# Patient Record
Sex: FEMALE | Race: OTHER | URBAN - METROPOLITAN AREA
[De-identification: names, ages, dates, MRNs, and addresses within clinical notes are randomized per-mention and may not be internally consistent; named-entity substitution may affect disease eponyms.]

---

## 2023-10-26 ENCOUNTER — EMPLOYEE HEALTH (OUTPATIENT)
Dept: OTHER | Facility: HOSPITAL | Age: 28
End: 2023-10-26
Attending: PREVENTIVE MEDICINE

## 2023-10-26 DIAGNOSIS — Z00.00 WELLNESS EXAMINATION: ICD-10-CM

## 2023-10-26 DIAGNOSIS — Z11.1 SCREENING-PULMONARY TB: Primary | ICD-10-CM

## 2023-10-26 LAB
RUBV IGG SER QL: POSITIVE
RUBV IGG SER-ACNC: 40.9 IU/ML (ref 10–?)

## 2023-10-26 PROCEDURE — 86787 VARICELLA-ZOSTER ANTIBODY: CPT

## 2023-10-26 PROCEDURE — 86480 TB TEST CELL IMMUN MEASURE: CPT

## 2023-10-26 PROCEDURE — 86765 RUBEOLA ANTIBODY: CPT

## 2023-10-26 PROCEDURE — 86735 MUMPS ANTIBODY: CPT

## 2023-10-26 PROCEDURE — 86762 RUBELLA ANTIBODY: CPT

## 2023-10-27 LAB
MEV IGG SER-ACNC: 68.4 AU/ML (ref 16.5–?)
MUV IGG SER IA-ACNC: 19.9 AU/ML (ref 11–?)
VZV IGG SER IA-ACNC: 1876 (ref 165–?)

## 2023-10-30 LAB
M TB IFN-G CD4+ T-CELLS BLD-ACNC: 0.03 IU/ML
M TB TUBERC IFN-G BLD QL: NEGATIVE
M TB TUBERC IGNF/MITOGEN IGNF CONTROL: >10 IU/ML
QFT TB1 AG MINUS NIL: 0.03 IU/ML
QFT TB2 AG MINUS NIL: 0.01 IU/ML

## 2024-02-09 ENCOUNTER — LAB ENCOUNTER (OUTPATIENT)
Dept: LAB | Age: 29
End: 2024-02-09
Attending: PHYSICIAN ASSISTANT
Payer: COMMERCIAL

## 2024-02-09 ENCOUNTER — OFFICE VISIT (OUTPATIENT)
Dept: FAMILY MEDICINE CLINIC | Facility: CLINIC | Age: 29
End: 2024-02-09
Payer: COMMERCIAL

## 2024-02-09 ENCOUNTER — PATIENT MESSAGE (OUTPATIENT)
Dept: FAMILY MEDICINE CLINIC | Facility: CLINIC | Age: 29
End: 2024-02-09

## 2024-02-09 VITALS
SYSTOLIC BLOOD PRESSURE: 138 MMHG | WEIGHT: 211 LBS | DIASTOLIC BLOOD PRESSURE: 84 MMHG | HEART RATE: 73 BPM | HEIGHT: 64.57 IN | BODY MASS INDEX: 35.59 KG/M2

## 2024-02-09 DIAGNOSIS — E55.9 VITAMIN D DEFICIENCY: ICD-10-CM

## 2024-02-09 DIAGNOSIS — E66.09 CLASS 2 OBESITY DUE TO EXCESS CALORIES WITHOUT SERIOUS COMORBIDITY WITH BODY MASS INDEX (BMI) OF 35.0 TO 35.9 IN ADULT: ICD-10-CM

## 2024-02-09 DIAGNOSIS — Z00.00 ROUTINE GENERAL MEDICAL EXAMINATION AT A HEALTH CARE FACILITY: ICD-10-CM

## 2024-02-09 DIAGNOSIS — Z00.00 WELLNESS EXAMINATION: Primary | ICD-10-CM

## 2024-02-09 DIAGNOSIS — L30.9 DERMATITIS: ICD-10-CM

## 2024-02-09 PROBLEM — E66.812 CLASS 2 OBESITY DUE TO EXCESS CALORIES WITHOUT SERIOUS COMORBIDITY WITH BODY MASS INDEX (BMI) OF 35.0 TO 35.9 IN ADULT: Status: ACTIVE | Noted: 2024-02-09

## 2024-02-09 LAB
ALBUMIN SERPL-MCNC: 4.4 G/DL (ref 3.2–4.8)
ALBUMIN/GLOB SERPL: 1.3 {RATIO} (ref 1–2)
ALP LIVER SERPL-CCNC: 71 U/L
ALT SERPL-CCNC: 31 U/L
ANION GAP SERPL CALC-SCNC: 9 MMOL/L (ref 0–18)
AST SERPL-CCNC: 22 U/L (ref ?–34)
BASOPHILS # BLD AUTO: 0.05 X10(3) UL (ref 0–0.2)
BASOPHILS NFR BLD AUTO: 0.6 %
BILIRUB SERPL-MCNC: 0.4 MG/DL (ref 0.3–1.2)
BUN BLD-MCNC: 13 MG/DL (ref 9–23)
BUN/CREAT SERPL: 19.1 (ref 10–20)
CALCIUM BLD-MCNC: 9.3 MG/DL (ref 8.7–10.4)
CHLORIDE SERPL-SCNC: 104 MMOL/L (ref 98–112)
CHOLEST SERPL-MCNC: 163 MG/DL (ref ?–200)
CO2 SERPL-SCNC: 25 MMOL/L (ref 21–32)
CREAT BLD-MCNC: 0.68 MG/DL
DEPRECATED RDW RBC AUTO: 36.4 FL (ref 35.1–46.3)
EGFRCR SERPLBLD CKD-EPI 2021: 120 ML/MIN/1.73M2 (ref 60–?)
EOSINOPHIL # BLD AUTO: 0.34 X10(3) UL (ref 0–0.7)
EOSINOPHIL NFR BLD AUTO: 3.8 %
ERYTHROCYTE [DISTWIDTH] IN BLOOD BY AUTOMATED COUNT: 12.8 % (ref 11–15)
FASTING PATIENT LIPID ANSWER: NO
FASTING STATUS PATIENT QL REPORTED: NO
GLOBULIN PLAS-MCNC: 3.4 G/DL (ref 2.8–4.4)
GLUCOSE BLD-MCNC: 109 MG/DL (ref 70–99)
HCT VFR BLD AUTO: 38.7 %
HDLC SERPL-MCNC: 52 MG/DL (ref 40–59)
HGB BLD-MCNC: 12.9 G/DL
IMM GRANULOCYTES # BLD AUTO: 0.02 X10(3) UL (ref 0–1)
IMM GRANULOCYTES NFR BLD: 0.2 %
LDLC SERPL CALC-MCNC: 82 MG/DL (ref ?–100)
LYMPHOCYTES # BLD AUTO: 2.76 X10(3) UL (ref 1–4)
LYMPHOCYTES NFR BLD AUTO: 30.8 %
MCH RBC QN AUTO: 26.5 PG (ref 26–34)
MCHC RBC AUTO-ENTMCNC: 33.3 G/DL (ref 31–37)
MCV RBC AUTO: 79.5 FL
MONOCYTES # BLD AUTO: 0.42 X10(3) UL (ref 0.1–1)
MONOCYTES NFR BLD AUTO: 4.7 %
NEUTROPHILS # BLD AUTO: 5.38 X10 (3) UL (ref 1.5–7.7)
NEUTROPHILS # BLD AUTO: 5.38 X10(3) UL (ref 1.5–7.7)
NEUTROPHILS NFR BLD AUTO: 59.9 %
NONHDLC SERPL-MCNC: 111 MG/DL (ref ?–130)
OSMOLALITY SERPL CALC.SUM OF ELEC: 287 MOSM/KG (ref 275–295)
PLATELET # BLD AUTO: 287 10(3)UL (ref 150–450)
POTASSIUM SERPL-SCNC: 3.3 MMOL/L (ref 3.5–5.1)
PROT SERPL-MCNC: 7.8 G/DL (ref 5.7–8.2)
RBC # BLD AUTO: 4.87 X10(6)UL
SODIUM SERPL-SCNC: 138 MMOL/L (ref 136–145)
TRIGL SERPL-MCNC: 168 MG/DL (ref 30–149)
TSI SER-ACNC: 1.68 MIU/ML (ref 0.55–4.78)
VIT B12 SERPL-MCNC: 478 PG/ML (ref 211–911)
VIT D+METAB SERPL-MCNC: 13.8 NG/ML (ref 30–100)
VLDLC SERPL CALC-MCNC: 27 MG/DL (ref 0–30)
WBC # BLD AUTO: 9 X10(3) UL (ref 4–11)

## 2024-02-09 PROCEDURE — 99385 PREV VISIT NEW AGE 18-39: CPT | Performed by: PHYSICIAN ASSISTANT

## 2024-02-09 PROCEDURE — 80053 COMPREHEN METABOLIC PANEL: CPT

## 2024-02-09 PROCEDURE — 36415 COLL VENOUS BLD VENIPUNCTURE: CPT

## 2024-02-09 PROCEDURE — 85025 COMPLETE CBC W/AUTO DIFF WBC: CPT

## 2024-02-09 PROCEDURE — 80061 LIPID PANEL: CPT

## 2024-02-09 PROCEDURE — 82306 VITAMIN D 25 HYDROXY: CPT

## 2024-02-09 PROCEDURE — 83036 HEMOGLOBIN GLYCOSYLATED A1C: CPT

## 2024-02-09 PROCEDURE — 82607 VITAMIN B-12: CPT

## 2024-02-09 PROCEDURE — 84443 ASSAY THYROID STIM HORMONE: CPT

## 2024-02-09 RX ORDER — ALBUTEROL SULFATE 90 UG/1
AEROSOL, METERED RESPIRATORY (INHALATION)
COMMUNITY
Start: 2023-10-31

## 2024-02-09 RX ORDER — TRIAMCINOLONE ACETONIDE 1 MG/G
CREAM TOPICAL 2 TIMES DAILY PRN
Qty: 60 G | Refills: 1 | Status: SHIPPED | OUTPATIENT
Start: 2024-02-09 | End: 2024-02-10

## 2024-02-09 NOTE — PROGRESS NOTES
HPI:   Racheal Clemente is a 28 year old female who presents for an Annual Health Visit.   Patient is doing fine at this time. Patient denies of chest pain, SOB, N/V/C/D, fever, dizziness, syncope, abdominal pain. There are no other concerns today.      Allergies:     Allergies   Allergen Reactions    Cat Hair Extract Coughing, ITCHING, RASH and SHORTNESS OF BREATH    Mushrooms RASH     Around mouth       CURRENT MEDICATIONS   Current Outpatient Medications   Medication Sig Dispense Refill    triamcinolone 0.1 % External Cream Apply topically 2 (two) times daily as needed. 60 g 1    albuterol 108 (90 Base) MCG/ACT Inhalation Aero Soln INHALE 2 PUFFS BY MOUTH EVERY 6 HOURS FOR 7 DAYS (Patient not taking: Reported on 2/9/2024)        HISTORICAL INFORMATION   History reviewed. No pertinent past medical history.   History reviewed. No pertinent surgical history.   History reviewed. No pertinent family history.   SOCIAL HISTORY   Social History     Socioeconomic History    Marital status: Single   Tobacco Use    Smoking status: Never    Smokeless tobacco: Never   Vaping Use    Vaping Use: Never used   Substance and Sexual Activity    Alcohol use: Yes    Drug use: Never     Social History     Social History Narrative    Not on file        REVIEW OF SYSTEMS:     Review of Systems   Constitutional: Negative.    HENT: Negative.     Eyes: Negative.    Respiratory: Negative.     Cardiovascular: Negative.    Gastrointestinal: Negative.    Genitourinary: Negative.    Musculoskeletal: Negative.    Skin: Negative.    Neurological: Negative.    Psychiatric/Behavioral: Negative.           EXAM:   /84   Pulse 73   Ht 5' 4.57\" (1.64 m)   Wt 211 lb (95.7 kg)   LMP 01/18/2024 (Approximate)   BMI 35.58 kg/m²    Wt Readings from Last 6 Encounters:   02/09/24 211 lb (95.7 kg)     Body mass index is 35.58 kg/m².    Physical Exam  Vitals reviewed.   Constitutional:       Appearance: She is well-developed.   HENT:       Head: Normocephalic and atraumatic.      Right Ear: Tympanic membrane, ear canal and external ear normal. There is no impacted cerumen.      Left Ear: Tympanic membrane, ear canal and external ear normal. There is no impacted cerumen.      Nose: Nose normal.      Mouth/Throat:      Mouth: Mucous membranes are moist.      Pharynx: Oropharynx is clear. No oropharyngeal exudate or posterior oropharyngeal erythema.   Eyes:      General:         Right eye: No discharge.         Left eye: No discharge.      Conjunctiva/sclera: Conjunctivae normal.   Cardiovascular:      Rate and Rhythm: Normal rate and regular rhythm.      Heart sounds: Normal heart sounds.   Pulmonary:      Effort: Pulmonary effort is normal.      Breath sounds: Normal breath sounds.   Abdominal:      General: Abdomen is flat. Bowel sounds are normal. There is no distension.      Palpations: Abdomen is soft.      Tenderness: There is no abdominal tenderness. There is no right CVA tenderness or left CVA tenderness.   Genitourinary:     Vagina: Normal.   Musculoskeletal:         General: Normal range of motion.      Cervical back: Normal range of motion and neck supple.   Skin:     Findings: Rash present.   Neurological:      Mental Status: She is alert and oriented to person, place, and time.   Psychiatric:         Behavior: Behavior normal.         Thought Content: Thought content normal.         Judgment: Judgment normal.      There are macular rashes on the arms.    ASSESSMENT AND PLAN:   Racheal was seen today for new patient and routine physical.    Diagnoses and all orders for this visit:    Routine general medical examination at a health care facility  -     CBC With Differential With Platelet; Future  -     Comp Metabolic Panel (14); Future  -     Lipid Panel; Future  -     TSH W Reflex To Free T4; Future  -     Vitamin B12; Future  -     Hemoglobin A1C; Future    Vitamin D deficiency  -     Vitamin D; Future    Class 2 obesity due to excess  calories without serious comorbidity with body mass index (BMI) of 35.0 to 35.9 in adult  -     CBC With Differential With Platelet; Future  -     Comp Metabolic Panel (14); Future  -     Lipid Panel; Future  -     TSH W Reflex To Free T4; Future  -     Vitamin B12; Future  -     Hemoglobin A1C; Future    Dermatitis  -     triamcinolone 0.1 % External Cream; Apply topically 2 (two) times daily as needed.    Overall health discussed, exercise/activity appropriate for age and health status, heathy diety, preventive care, and upcoming screening discussed. Routine labs ordered.    There are no Patient Instructions on file for this visit.    The patient indicates understanding of these issues and agrees to the plan.    Problem List:  Patient Active Problem List   Diagnosis    Class 2 obesity due to excess calories without serious comorbidity with body mass index (BMI) of 35.0 to 35.9 in adult       Avi Thomsa PA-C  2/9/2024  2:39 PM

## 2024-02-10 LAB
EST. AVERAGE GLUCOSE BLD GHB EST-MCNC: 114 MG/DL (ref 68–126)
HBA1C MFR BLD: 5.6 % (ref ?–5.7)

## 2024-02-10 RX ORDER — TRIAMCINOLONE ACETONIDE 1 MG/G
CREAM TOPICAL 2 TIMES DAILY PRN
Qty: 60 G | Refills: 1 | Status: SHIPPED | OUTPATIENT
Start: 2024-02-10

## 2024-02-10 NOTE — TELEPHONE ENCOUNTER
Haven Oropeza, RN 2/10/2024 11:15 AM CST        ----- Message -----  From: Racheal Clemente  Sent: 2/9/2024 5:26 PM CST  To: Em Triage Support  Subject: RX     Hello!  My medication was sent to the wrong pharmacy; the location the medication was sent is a 35 [address] from where I live   it’s supposed to be [facility] on [address]   [address] [place] [address]

## 2024-02-12 ENCOUNTER — TELEPHONE (OUTPATIENT)
Dept: ORTHOPEDICS CLINIC | Facility: CLINIC | Age: 29
End: 2024-02-12

## 2024-02-12 DIAGNOSIS — M25.562 LEFT KNEE PAIN, UNSPECIFIED CHRONICITY: Primary | ICD-10-CM

## 2024-02-12 DIAGNOSIS — M25.561 PAIN IN BOTH KNEES, UNSPECIFIED CHRONICITY: ICD-10-CM

## 2024-02-12 DIAGNOSIS — M25.562 PAIN IN BOTH KNEES, UNSPECIFIED CHRONICITY: ICD-10-CM

## 2024-02-16 ENCOUNTER — LAB ENCOUNTER (OUTPATIENT)
Dept: LAB | Age: 29
End: 2024-02-16
Attending: PHYSICIAN ASSISTANT
Payer: COMMERCIAL

## 2024-02-16 DIAGNOSIS — E87.6 HYPOKALEMIA: ICD-10-CM

## 2024-02-16 LAB — POTASSIUM SERPL-SCNC: 3.3 MMOL/L (ref 3.5–5.1)

## 2024-02-16 PROCEDURE — 84132 ASSAY OF SERUM POTASSIUM: CPT

## 2024-02-16 PROCEDURE — 36415 COLL VENOUS BLD VENIPUNCTURE: CPT

## 2024-02-23 ENCOUNTER — LAB ENCOUNTER (OUTPATIENT)
Dept: LAB | Age: 29
End: 2024-02-23
Attending: PHYSICIAN ASSISTANT
Payer: COMMERCIAL

## 2024-02-23 DIAGNOSIS — E87.6 HYPOKALEMIA: ICD-10-CM

## 2024-02-23 LAB — POTASSIUM SERPL-SCNC: 3.7 MMOL/L (ref 3.5–5.1)

## 2024-02-23 PROCEDURE — 36415 COLL VENOUS BLD VENIPUNCTURE: CPT

## 2024-02-23 PROCEDURE — 84132 ASSAY OF SERUM POTASSIUM: CPT

## 2024-02-23 NOTE — TELEPHONE ENCOUNTER
XR ordered per ortho protocol. XR scheduled and patient was notified that they should arrive 15-20 minutes early, in order for them to complete imaging.

## 2024-03-15 ENCOUNTER — APPOINTMENT (OUTPATIENT)
Dept: GENERAL RADIOLOGY | Age: 29
End: 2024-03-15
Attending: PHYSICIAN ASSISTANT
Payer: COMMERCIAL

## 2024-03-15 ENCOUNTER — HOSPITAL ENCOUNTER (OUTPATIENT)
Age: 29
Discharge: HOME | End: 2024-03-15
Payer: COMMERCIAL

## 2024-03-15 VITALS
OXYGEN SATURATION: 100 % | TEMPERATURE: 98 F | RESPIRATION RATE: 20 BRPM | DIASTOLIC BLOOD PRESSURE: 107 MMHG | HEART RATE: 89 BPM | SYSTOLIC BLOOD PRESSURE: 157 MMHG

## 2024-03-15 DIAGNOSIS — S39.012A LUMBOSACRAL STRAIN, INITIAL ENCOUNTER: Primary | ICD-10-CM

## 2024-03-15 PROCEDURE — 99213 OFFICE O/P EST LOW 20 MIN: CPT

## 2024-03-15 PROCEDURE — 99203 OFFICE O/P NEW LOW 30 MIN: CPT

## 2024-03-15 PROCEDURE — 72100 X-RAY EXAM L-S SPINE 2/3 VWS: CPT | Performed by: PHYSICIAN ASSISTANT

## 2024-03-15 RX ORDER — IBUPROFEN 600 MG/1
600 TABLET ORAL EVERY 8 HOURS PRN
Qty: 15 TABLET | Refills: 0 | Status: SHIPPED | OUTPATIENT
Start: 2024-03-15 | End: 2024-03-20

## 2024-03-15 RX ORDER — LIDOCAINE 4 G/G
1 PATCH TOPICAL EVERY 24 HOURS
Qty: 10 PATCH | Refills: 0 | Status: SHIPPED | OUTPATIENT
Start: 2024-03-15 | End: 2024-03-25

## 2024-03-15 NOTE — ED INITIAL ASSESSMENT (HPI)
Patient arrives ambulatory [facility] c/o lower right back pain since Monday. Reports being unable to \"sit correctly\". Does report that she carries her son a lot. States sometimes the pain radiates down right leg.

## 2024-03-15 NOTE — ED PROVIDER NOTES
Chief Complaint   Patient presents with    Back Pain       HPI:     Racheal Clemente is a 28 year old female who presents for evaluation of 5-day history of right lower back pain.  Notes took Tylenol with mild improvement in previous days.  Denies previous evaluation for lower back pain including disc herniation or sciatica.  Notes pain worse with mobility and bending.  Denies associated injury fevers chills headache dizziness cough congestion sore throat neck pain chest pain shortness of breath abdominal pain vomiting diarrhea dysuria hematuria medic Amber urinary retention incontinence upper lower extremity weakness numbness or associated pain.  Ambulatory without assistance on arrival, pain maximum, 6 out of 10.      PFSH    PFS asessment screens reviewed and agree.  Nurses notes reviewed I agree with documentation.    History reviewed. No pertinent family history.  Family history reviewed with patient/caregiver and is not pertinent to presenting problem.  Social History     Socioeconomic History    Marital status: Single     Spouse name: Not on file    Number of children: Not on file    Years of education: Not on file    Highest education level: Not on file   Occupational History    Not on file   Tobacco Use    Smoking status: Never    Smokeless tobacco: Never   Vaping Use    Vaping Use: Never used   Substance and Sexual Activity    Alcohol use: Yes    Drug use: Never    Sexual activity: Not on file   Other Topics Concern    Not on file   Social History Narrative    Not on file     Social Determinants of Health     Financial Resource Strain: Not on file   Food Insecurity: Not on file   Transportation Needs: Not on file   Physical Activity: Not on file   Stress: Not on file   Social Connections: Not on file   Housing Stability: Not on file         ROS:   Positive for stated complaint: Back pain.  All other systems reviewed and negative except as noted above.  Constitutional and Vital Signs  Reviewed.      Physical Exam:     Findings:    BP (!) 157/107   Pulse 89   Temp 98 °F (36.7 °C) (Temporal)   Resp 20   LMP 02/19/2024 (Approximate)   SpO2 100%   GENERAL: well developed, well nourished, well hydrated, no distress  SKIN: good skin turgor, no obvious rashes  EXTREMITIES: Mild right paralumbar inferior aspect tenderness.  No midline tenderness.  No hip or pelvic tenderness or instability.  Normal straight leg raise bilaterally.  DP pulse and distal sensation intact.  NO Pitting edema.  YEN without difficulty  GI: No CVA or adnexal tenderness.  Negative Lechuga.  Negative McBurney.  No rebound.  Soft, non-tender, normal bowel sounds  HEAD: normocephalic, atraumatic  EYES: sclera non icteric bilateral, conjunctiva clear  NEURO: No focal deficits  PSYCH: Alert and oriented x3.  Answering questions appropriately.  Mood appropriate.    MDM/Assessment/Plan:   Orders for this encounter:    Orders Placed This Encounter    XR LUMBAR SPINE (MIN 2 VIEWS) (CPT=72100)     Order Specific Question:   What is the Relevant Clinical Indication / Reason for Exam?     Answer:   Lower back pain, rule out degenerative changes versus spondylolithiasis     Order Specific Question:   Release to patient     Answer:   Immediate    ibuprofen 600 MG Oral Tab     Sig: Take 1 tablet (600 mg total) by mouth every 8 (eight) hours as needed for Pain.     Dispense:  15 tablet     Refill:  0    lidocaine 4 % External Patch     Sig: Place 1 patch onto the skin daily for 10 days.     Dispense:  10 patch     Refill:  0       Labs performed this visit:  No results found for this or any previous visit (from the past 10 hour(s)).    MDM:  X-rays showed 6 lumbar vertebrae otherwise no acute process.  Exam and history most reflective of lumbosacral strain with associated spasms.  Patient agrees with topical lidocaine patch with alternative NSAID over the next few days versus previously used Tylenol.  Discussed muscle relaxer with patient  agreeing to hold pending therapeutic reevaluation next week if not improving to comfort.  Discussed PT considerations based on lingering symptoms through primary discretion.  No focal changes.  Instructed indications to go to the ER.  Happy with plan of care.    Diagnosis:    ICD-10-CM    1. Lumbosacral strain, initial encounter  S39.012A           All results reviewed and discussed with patient.  See AVS for detailed discharge instructions for your condition today.    Follow Up with:  Bee Biswas MD  [address]  [place] [address] [phone]    In 1 week  Primary referral if able to see your doctor.

## 2024-03-18 RX ORDER — ALBUTEROL SULFATE 90 UG/1
2 AEROSOL, METERED RESPIRATORY (INHALATION) EVERY 4 HOURS PRN
Qty: 18 G | Refills: 0 | Status: SHIPPED | OUTPATIENT
Start: 2024-03-18

## 2024-03-18 NOTE — TELEPHONE ENCOUNTER
Albuterol    Last OV: 2/9/24  Last refill date: 10/31/23 #/refills: 1/0  Upcoming appt: No future appointments.

## 2024-03-19 ENCOUNTER — TELEPHONE (OUTPATIENT)
Dept: ORTHOPEDICS CLINIC | Facility: CLINIC | Age: 29
End: 2024-03-19

## 2024-03-21 ENCOUNTER — OFFICE VISIT (OUTPATIENT)
Dept: ORTHOPEDICS CLINIC | Facility: CLINIC | Age: 29
End: 2024-03-21
Payer: COMMERCIAL

## 2024-03-21 VITALS — HEIGHT: 64 IN | WEIGHT: 211 LBS | BODY MASS INDEX: 36.02 KG/M2

## 2024-03-21 DIAGNOSIS — M54.16 LUMBAR RADICULOPATHY: Primary | ICD-10-CM

## 2024-03-21 DIAGNOSIS — Q76.49 LUMBARIZATION, VERTEBRA: ICD-10-CM

## 2024-03-21 PROCEDURE — 99204 OFFICE O/P NEW MOD 45 MIN: CPT | Performed by: FAMILY MEDICINE

## 2024-03-21 RX ORDER — DICLOFENAC SODIUM 75 MG/1
75 TABLET, DELAYED RELEASE ORAL 2 TIMES DAILY
Qty: 28 TABLET | Refills: 1 | Status: SHIPPED | OUTPATIENT
Start: 2024-03-21

## 2024-03-21 RX ORDER — CYCLOBENZAPRINE HCL 10 MG
10 TABLET ORAL NIGHTLY
Qty: 20 TABLET | Refills: 0 | Status: SHIPPED | OUTPATIENT
Start: 2024-03-21 | End: 2024-04-10

## 2024-03-26 NOTE — H&P
Sports Medicine Clinic Note     Subjective:     Chief Complaint   Patient presents with    Back Pain     LOWER BACK PAIN  ONSET: 2024  - pt thinks it is due to lifting son, constant pain, pain occasionally radiates down the leg  - no previous injury      History of Present Illness: This is a very pleasant 28 year old female who presents with lower back pain that occasionally radiates down both legs. The pain is described as a sharp, shooting sensation that worsens with prolonged sitting and certain movements, specifically twisting and bending. The patient states that the symptoms have been relapsing remitting for over 10 years since childhood, but seemed to worsen over the past 2 years since delivering her son. She struggled with worsening back pain during pregnancy and feels the epidural also flared up her pain. 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 No alleviating factors are noted, and the patient mentions that over-the-counter NSAIDs have provided minimal relief. No prior episodes or imaging studies are reported.    Objective:     Physical Exam    Ht 5' 4\" (1.626 m)   Wt 211 lb (95.7 kg)   LMP 2024 (Approximate)   BMI 36.22 kg/m²     Constitutional:   NAD. AOx3. Well-developed and Well-nourished.     Psychiatric:   Normal mood/ affect/ behavior. Judgment and thought content normal.    Lumbar Examination:    Inspection:   No visible deformity, swelling, or discoloration in the lumbar area.    Palpation:   Paraspinal tenderness noted throughout the lumbar spine as well as the bilateral SI joints.    Active/Passive ROM:   Limited flexion and extension due to pain; lateral rotation exacerbates the symptoms right side>left.    Neurovascular:   Normal strength and sensation in the bilateral lower extremities. Symmetric deep tendon patellar and achilles reflexes. No vascular  abnormalities noted. No focal deficits.    Special Orthopedic Tests:   Positive straight leg raise bilaterally.    Imaging:    Radiographs of the lumbar spine were personally reviewed in the office. No apparent fractures or dislocations. No significant spondylosis. 6 lumbar type vertebral bodies are noted.    Assessment & Plan:     28 year old female with clinical history and examination consistent with    Lumbar radiculopathy, bilateral  S1 Lumbarization    The patient will be treated with the following:    The patient will benefit from a multi-modal treatment approach. A prescription for physical therapy will be provided, focusing on core strengthening and spinal stabilization exercises. NSAIDs and muscle relaxants can be used for pain control. Can consider neuromodulating medications such as Gabapentin in the future to augment. Activity is to be limited to tolerance for the time being, and the patient is advised to avoid lifting heavy objects or twisting motions. A lumbar brace may be used for additional support if needed. Tentative follow-up is scheduled for 4-6 weeks to assess the response to treatment.    Velasquez Post DO, CAQSM   Primary Care Sports Medicine    Department of Orthopaedic Surgery  [address] [address]   [address] [place] [address]    t: [phone]  f: [phone]      [facility]

## 2024-04-03 ENCOUNTER — HOSPITAL ENCOUNTER (OUTPATIENT)
Dept: GENERAL RADIOLOGY | Age: 29
Discharge: HOME | End: 2024-04-03
Attending: PHYSICIAN ASSISTANT
Payer: COMMERCIAL

## 2024-04-03 ENCOUNTER — TELEPHONE (OUTPATIENT)
Dept: ORTHOPEDICS CLINIC | Facility: CLINIC | Age: 29
End: 2024-04-03

## 2024-04-03 DIAGNOSIS — M25.561 PAIN IN BOTH KNEES, UNSPECIFIED CHRONICITY: ICD-10-CM

## 2024-04-03 DIAGNOSIS — M25.562 PAIN IN BOTH KNEES, UNSPECIFIED CHRONICITY: ICD-10-CM

## 2024-04-03 DIAGNOSIS — M25.561 PAIN IN BOTH KNEES, UNSPECIFIED CHRONICITY: Primary | ICD-10-CM

## 2024-04-03 DIAGNOSIS — M25.562 PAIN IN BOTH KNEES, UNSPECIFIED CHRONICITY: Primary | ICD-10-CM

## 2024-04-03 PROCEDURE — 73564 X-RAY EXAM KNEE 4 OR MORE: CPT | Performed by: PHYSICIAN ASSISTANT

## 2024-04-03 NOTE — TELEPHONE ENCOUNTER
XR ordered per ortho protocol. XR scheduled and patient was notified to let them know that they should arrive 15-20 minutes early, in order for them to complete imaging.

## 2024-04-04 ENCOUNTER — OFFICE VISIT (OUTPATIENT)
Dept: ORTHOPEDICS CLINIC | Facility: CLINIC | Age: 29
End: 2024-04-04
Payer: COMMERCIAL

## 2024-04-04 VITALS — HEIGHT: 64 IN | WEIGHT: 211 LBS | BODY MASS INDEX: 36.02 KG/M2

## 2024-04-04 DIAGNOSIS — M22.41 CHONDROMALACIA, PATELLA, RIGHT: ICD-10-CM

## 2024-04-04 DIAGNOSIS — M22.42 CHONDROMALACIA OF LEFT PATELLA: Primary | ICD-10-CM

## 2024-04-04 PROCEDURE — 99203 OFFICE O/P NEW LOW 30 MIN: CPT | Performed by: PHYSICIAN ASSISTANT

## 2024-04-04 NOTE — PROGRESS NOTES
[facility] New Patient Note    CC: Bilateral knee pain    HPI: This 28 year old female presents today with complaints of bilateral knee pain, left greater than right.  Onset of symptoms started in December 2023, approximately 5 months ago with no injury.  Pain is worse in the left knee compared to the right.  It is localized to the anterior and medial aspect of the knee and is worse with going up and down stairs and getting up from being seated for a long period of time.  She denies any significant swelling, catching, locking or instability.  She denies any previous injuries or surgeries to the knees.  For treatment, she has tried rest and bracing with no significant improvement in symptoms.  She is also being seen for her lumbar radiculopathy but feels this is unrelated.  She is here for further evaluation.    History reviewed. No pertinent past medical history.  History reviewed. No pertinent surgical history.  Current Outpatient Medications   Medication Sig Dispense Refill    cyclobenzaprine 10 MG Oral Tab Take 1 tablet (10 mg total) by mouth nightly for 20 days. 20 tablet 0    diclofenac 75 MG Oral Tab EC Take 1 tablet (75 mg total) by mouth 2 (two) times daily. 28 tablet 1    albuterol 108 (90 Base) MCG/ACT Inhalation Aero Soln Inhale 2 puffs into the lungs every 4 (four) hours as needed for Wheezing. 18 g 0    ergocalciferol 1.25 MG (50042 UT) Oral Cap Take 1 capsule (50,000 Units total) by mouth every 7 days. 12 capsule 3     Allergies   Allergen Reactions    Cat Hair Extract Coughing, ITCHING, RASH and SHORTNESS OF BREATH    Mushrooms RASH     Around mouth     History reviewed. No pertinent family history.  Social History     Occupational History    Not on file   Tobacco Use    Smoking status: Never    Smokeless tobacco: Never   Vaping Use    Vaping Use: Never used   Substance and Sexual Activity    Alcohol use: Yes    Drug use: Never    Sexual activity: Not on file        ROS:  Complete review of  symptoms was reviewed and is non-contributory to the chief complaint as mentioned above.      Physical Exam: She is a pleasant 28-year-old female in no acute distress.  She ambulates with a nonantalgic gait.  Exam of bilateral knees and lower extremities reveals that she has mild patellofemoral crepitus with range of motion bilaterally.  She has full extension and pain with flexion to 120 degrees.  No palpable effusion.  She does have medial joint line tenderness on the left knee.  No joint line tenderness of the right knee.  Negative Steinmann and Justin bilaterally.  Stable ligamentous exam.  Sensation is present to light touch.        Imaging: XR KNEE, COMPLETE (4 OR MORE VIEWS), LEFT (CPT=73564)    Result Date: 4/3/2024  CONCLUSION:  No acute fractures.   LOCATION:  [facility]   Dictated by (CST): David Rose MD on 4/03/2024 at 4:41 PM     Finalized by (CST): David Rose MD on 4/03/2024 at 4:43 PM       XR KNEE, COMPLETE (4 OR MORE VIEWS), RIGHT (CPT=73564)    Result Date: 4/3/2024  CONCLUSION:  No acute findings.   LOCATION:  [facility]   Dictated by (CST): David Rose MD on 4/03/2024 at 4:41 PM     Finalized by (CST): David Rose MD on 4/03/2024 at 4:41 PM              Assessment: Bilateral knee chondromalacia patella      Plan: I discussed xray and exam findings with the patient are consistent with chondromalacia patella.  I recommend continued conservative treatment including oral anti-inflammatories with a 7-day course of Aleve, quad stretching, and activity modification.  She will avoid any repetitive squatting or lunging type activities.  She is attending formal therapy for her lumbar spine and I would like to incorporate the knees for quad stretching.  A PT prescription was given.  If symptoms or not improving over the next 2 to 4 weeks, further imaging of the left knee with an MRI scan may be considered.  She will follow-up with me at any time for persistent or worsening symptoms,  questions or concerns.        Ada Sawyer PA-C  Orthopedic Surgery   [address] [place] [id number]   t: [phone]  f: [phone]

## 2024-04-11 RX ORDER — CYCLOBENZAPRINE HCL 10 MG
10 TABLET ORAL NIGHTLY
Qty: 20 TABLET | Refills: 0 | Status: SHIPPED | OUTPATIENT
Start: 2024-04-11 | End: 2024-04-11

## 2024-04-11 RX ORDER — CYCLOBENZAPRINE HCL 10 MG
5 TABLET ORAL NIGHTLY
Qty: 10 TABLET | Refills: 0 | Status: SHIPPED | OUTPATIENT
Start: 2024-04-11 | End: 2024-05-01

## 2024-05-07 ENCOUNTER — OFFICE VISIT (OUTPATIENT)
Dept: FAMILY MEDICINE CLINIC | Facility: CLINIC | Age: 29
End: 2024-05-07
Payer: COMMERCIAL

## 2024-05-07 VITALS
DIASTOLIC BLOOD PRESSURE: 80 MMHG | BODY MASS INDEX: 36.37 KG/M2 | SYSTOLIC BLOOD PRESSURE: 123 MMHG | WEIGHT: 213 LBS | HEART RATE: 69 BPM | HEIGHT: 64 IN

## 2024-05-07 DIAGNOSIS — G44.209 TENSION HEADACHE: Primary | ICD-10-CM

## 2024-05-07 PROCEDURE — 99213 OFFICE O/P EST LOW 20 MIN: CPT | Performed by: PHYSICIAN ASSISTANT

## 2024-05-07 RX ORDER — AMOXICILLIN AND CLAVULANATE POTASSIUM 875; 125 MG/1; MG/1
1 TABLET, FILM COATED ORAL 2 TIMES DAILY
COMMUNITY
Start: 2024-05-05

## 2024-05-07 NOTE — PROGRESS NOTES
HPI:     Headache   This is a new problem. The current episode started in the past 7 days. The problem has been unchanged. The pain is located in the Temporal region. The pain does not radiate. The quality of the pain is described as aching. Pertinent negatives include no abdominal pain, blurred vision, coughing, dizziness, ear pain, eye pain, fever, nausea, phonophobia, photophobia, rhinorrhea, scalp tenderness, sinus pressure, sore throat or vomiting.       Medications:     Current Outpatient Medications   Medication Sig Dispense Refill    amoxicillin clavulanate 875-125 MG Oral Tab Take 1 tablet by mouth 2 (two) times daily.      diclofenac 75 MG Oral Tab EC Take 1 tablet (75 mg total) by mouth 2 (two) times daily. 28 tablet 1    albuterol 108 (90 Base) MCG/ACT Inhalation Aero Soln Inhale 2 puffs into the lungs every 4 (four) hours as needed for Wheezing. 18 g 0    ergocalciferol 1.25 MG (67792 UT) Oral Cap Take 1 capsule (50,000 Units total) by mouth every 7 days. 12 capsule 3       Allergies:     Allergies   Allergen Reactions    Cat Hair Extract Coughing, ITCHING, RASH and SHORTNESS OF BREATH    Mushrooms RASH     Around mouth       History:     Health Maintenance   Topic Date Due    Pap Smear  Never done    COVID-19 Vaccine (3 - 2023-24 season) 09/01/2023    Annual Physical  02/09/2025    DTaP,Tdap,and Td Vaccines (3 - Td or Tdap) 12/20/2031    Influenza Vaccine  Completed    Annual Depression Screening  Completed    Pneumococcal Vaccine: Birth to 64yrs  Aged Out       Patient's last menstrual period was 04/23/2024 (approximate).   Past Medical History:   History reviewed. No pertinent past medical history.    Past Surgical History:   History reviewed. No pertinent surgical history.    Family History:   History reviewed. No pertinent family history.    Social History:     Social History     Socioeconomic History    Marital status: Single     Spouse name: Not on file    Number of children: Not on file    Years  of education: Not on file    Highest education level: Not on file   Occupational History    Not on file   Tobacco Use    Smoking status: Never    Smokeless tobacco: Never   Vaping Use    Vaping status: Never Used   Substance and Sexual Activity    Alcohol use: Yes    Drug use: Never    Sexual activity: Not on file   Other Topics Concern    Not on file   Social History Narrative    Not on file     Social Determinants of Health     Financial Resource Strain: Not on File (2022)    Received from [facility]     Financial Resource Strain     Financial Resource Strain: 0   Food Insecurity: Not on File (2022)    Received from [facility]     Food Insecurity     Food: 0   Transportation Needs: Not on File (2022)    Received from [facility]     Transportation Needs     Transportation: 0   Physical Activity: Not on File (2022)    Received from [facility]     Physical Activity     Physical Activity: 0   Stress: Not on File (2022)    Received from [facility]     Stress     Stress: 0   Social Connections: Not on File (2022)    Received from [facility]     Social Connections     Social Connections and Isolation: 0   Housing Stability: Not on File (2022)    Received from [facility]     Housing Stability     Housin       Review of Systems:   Review of Systems   Constitutional:  Negative for activity change, chills, fatigue and fever.   HENT:  Negative for congestion, ear discharge, ear pain, postnasal drip, rhinorrhea, sinus pressure, sinus pain and sore throat.    Eyes:  Negative for blurred vision, photophobia and pain.   Respiratory:  Negative for cough, chest tightness, shortness of breath and wheezing.    Cardiovascular:  Negative for chest pain and palpitations.   Gastrointestinal:  Negative for abdominal distention, abdominal pain, blood in stool, constipation, diarrhea, nausea and vomiting.   Skin:  Negative for rash.   Neurological:  Positive for headaches. Negative for dizziness.        Vitals:    24 1402   BP:  123/80   Pulse: 69   Weight: 213 lb (96.6 kg)   Height: 5' 4\" (1.626 m)     Body mass index is 36.56 kg/m².    Physical Exam:   Physical Exam  Vitals reviewed.   Constitutional:       General: She is not in acute distress.     Appearance: She is well-developed.   HENT:      Head: Normocephalic and atraumatic.      Right Ear: Tympanic membrane, ear canal and external ear normal. There is no impacted cerumen.      Left Ear: Tympanic membrane, ear canal and external ear normal. There is no impacted cerumen.      Nose: Nose normal.      Mouth/Throat:      Mouth: Mucous membranes are moist.      Pharynx: Oropharynx is clear. No oropharyngeal exudate or posterior oropharyngeal erythema.   Eyes:      General:         Right eye: No discharge.         Left eye: No discharge.      Conjunctiva/sclera: Conjunctivae normal.   Cardiovascular:      Rate and Rhythm: Normal rate and regular rhythm.      Heart sounds: Normal heart sounds, S1 normal and S2 normal. No murmur heard.  Pulmonary:      Effort: Pulmonary effort is normal.      Breath sounds: Normal breath sounds. No wheezing or rales.   Chest:      Chest wall: No tenderness.   Lymphadenopathy:      Cervical: No cervical adenopathy.   Skin:     Findings: No rash.   Neurological:      Mental Status: She is alert and oriented to person, place, and time.   Psychiatric:         Behavior: Behavior is cooperative.        Assessment and Plan::     Problem List Items Addressed This Visit    None  Visit Diagnoses       Tension headache    -  Primary        Advise patient to take Tylenol or Ibuprofen as needed for pain.    Discussed plan of care with pt and pt is in agreement.All questions answered. Pt to call with questions or concerns.

## 2024-05-24 ENCOUNTER — NURSE TRIAGE (OUTPATIENT)
Dept: FAMILY MEDICINE CLINIC | Facility: CLINIC | Age: 29
End: 2024-05-24

## 2024-05-24 NOTE — TELEPHONE ENCOUNTER
Action Requested: Summary for Provider     []  Critical Lab, Recommendations Needed  [x] Need Additional Advice  []   FYI    []   Need Orders  [x] Need Medications Sent to Pharmacy  []  Other     SUMMARY: Yonas Thomas PA: patient is asking if you can send medication?    Reason for call: Vaginal Discharge  Onset: 2 days ago    Patient complains of vaginal white discharge, vaginal irritation. Burning sensation.     Patient was recommended appointment, no available appointment and patient is leaving town this weekend.   Reason for Disposition   Symptoms of a yeast infection' (i.e., itchy, white discharge, not bad smelling) and not improved > 3 days following Care Advice    Protocols used: Vaginal [id number]-[id number]-WR    Patient also sent details in [facility]. See other encounter today.

## 2024-05-25 NOTE — TELEPHONE ENCOUNTER
Spoke with patient and she is currently out of town. Having vaginal itching and some slight burning sensation with urination. She inquired as to any over the counter products she can use while out of town. Advised , miconazole (Monistat-3) push fluids specifically water. For worsening symptoms Immediate or Urgent care in the area. Scheduled an office visit for 5/28/24 at 2 pm with Oliver

## 2024-07-31 ENCOUNTER — HOSPITAL ENCOUNTER (OUTPATIENT)
Dept: GENERAL RADIOLOGY | Age: 29
Discharge: HOME | End: 2024-07-31
Attending: PHYSICIAN ASSISTANT
Payer: COMMERCIAL

## 2024-07-31 DIAGNOSIS — G89.29 CHRONIC PAIN OF LEFT ANKLE: ICD-10-CM

## 2024-07-31 DIAGNOSIS — M25.572 CHRONIC PAIN OF LEFT ANKLE: ICD-10-CM

## 2024-07-31 PROCEDURE — 73610 X-RAY EXAM OF ANKLE: CPT | Performed by: PHYSICIAN ASSISTANT

## 2024-08-01 ENCOUNTER — OFFICE VISIT (OUTPATIENT)
Dept: ORTHOPEDICS CLINIC | Facility: CLINIC | Age: 29
End: 2024-08-01
Payer: COMMERCIAL

## 2024-08-01 VITALS — HEIGHT: 63 IN | WEIGHT: 210 LBS | BODY MASS INDEX: 37.21 KG/M2

## 2024-08-01 DIAGNOSIS — M25.572 CHRONIC PAIN OF LEFT ANKLE: Primary | ICD-10-CM

## 2024-08-01 DIAGNOSIS — G89.29 CHRONIC PAIN OF LEFT ANKLE: Primary | ICD-10-CM

## 2024-08-01 PROCEDURE — 99213 OFFICE O/P EST LOW 20 MIN: CPT | Performed by: PHYSICIAN ASSISTANT

## 2024-08-01 NOTE — PROGRESS NOTES
[facility] New Problem Note    CC: Left ankle pain    HPI: This 28 year old female presents today with complaints of left ankle pain.  Onset of symptoms started about 2 years ago when she tripped and fell over an large [place].  She twisted the ankle and had difficulty with weightbearing for a week.  She was seen and evaluated by chiropractor who advised conservative treatment.  She was seen a week thereafter and underwent an adjustment which caused significant pain.  Since then, she has noticed intermittent pain and swelling.  Pain is localized to the anterolateral aspect of the ankle.  It is worse with weightbearing activities.  She does note associated swelling.  For treatment, she has tried rest, ice and elevation with no improvement in symptoms.  Of note she also notes some numbness and tingling in the foot occasionally as well.  She is here for further evaluation.    History reviewed. No pertinent past medical history.  History reviewed. No pertinent surgical history.  Current Outpatient Medications   Medication Sig Dispense Refill    diclofenac 75 MG Oral Tab EC Take 1 tablet (75 mg total) by mouth 2 (two) times daily. 28 tablet 1    albuterol 108 (90 Base) MCG/ACT Inhalation Aero Soln Inhale 2 puffs into the lungs every 4 (four) hours as needed for Wheezing. 18 g 0     Allergies   Allergen Reactions    Cat Hair Extract Coughing, ITCHING, RASH and SHORTNESS OF BREATH    Mushrooms RASH     Around mouth     History reviewed. No pertinent family history.  Social History     Occupational History    Not on file   Tobacco Use    Smoking status: Never    Smokeless tobacco: Never   Vaping Use    Vaping status: Never Used   Substance and Sexual Activity    Alcohol use: Yes    Drug use: Never    Sexual activity: Not on file        ROS:  Complete review of system was reviewed and is non-contributory to the chief complaint except as mentioned above.      Physical Exam: She is a pleasant 28-year-old female in no acute  distress.  She ambulates with a nonantalgic gait.  Exam of the left foot and ankle reveals that the overlying skin is intact.  She has symmetric dorsiflexion plantarflexion of the ankle.  No tenderness to palpation over the medial malleolus or lateral malleolus.  No tenderness at the calcaneofibular ligament.  She is tender to palpation over the ATFL.  She has pain with inversion of the foot.  No pain with eversion.  She has a stable anterior drawer but discomfort with this test.  Sensation is present to light touch.          Imaging: XR ANKLE (MIN 3 VIEWS), LEFT (CPT=73610)    Result Date: 7/31/2024  CONCLUSION:  Mild calcaneal spurring.  No fracture or dislocation.   LOCATION:  [facility]   Dictated by (CST): Jay Wood MD on 7/31/2024 at 6:38 PM     Finalized by (CST): Jay Wood MD on 7/31/2024 at 6:39 PM              Assessment: Chronic left ankle pain      Plan: I discussed x-ray and exam findings with the patient show no previous fractures.  She most likely strained the left ankle but unfortunately this has caused persistent symptoms.  She does have tenderness localized over the ATFL.  I recommend continued conservative treatment including oral anti-inflammatories as needed, formal physical therapy and brace protection for longer walks.  She does have an upcoming trip to the [place] and an ankle brace was given during the visit.  She will contact me when she returns from vacation to initiate formal therapy she will also try compression stockings.  If symptoms are worsening including pain and instability, further imaging including an MRI scan may be considered at that time.  She will follow-up with me in the meantime with any worsening symptoms, questions or concerns.        Ada Sawyer PA-C  Orthopedic Surgery   [address] [place] [id number]   t: [phone]  f: [phone]

## 2024-09-02 NOTE — PROGRESS NOTES
Chief Complaint   Patient presents with    Gyn Exam     Pt c/o vaginal pain starting a week ago after intercourse and has been going on ever since starting to feel burning and more discomfort.    Annual     Pt is here for pap smear. Declined chaperone             HPI:   Racheal is 28 year old , here today for Annual Exam and vaginal pain for about a week after intercourse.  Had a burning sensation afterwards. After about 5 days felt itching and had intercourse again and feels like paper cuts.     Menstrual cycles: regular/ 6-7 days/ light, then heavy then spotting   Sexually active: yes, partner of 11 years with 2 children  Contraception: none, does want a 3rd child  STD screening: desires  Depression: denies    Diet/Exercise: general/ a lot of walking at [facility] [place]  Routine dental care: has not gone for a year, has to get wisdom teeth pulled  Seatbelt use: yes    Note: This is a gyn only visit. Pt has PCP none /is referred back to PCP for care of non-gyn any concerns listed here.    HISTORY:  History reviewed. No pertinent past medical history.   Hx Prior Abnormal Pap: No    History reviewed. No pertinent surgical history.   History reviewed. No pertinent family history.   Social History:   Social History     Socioeconomic History    Marital status: Single   Tobacco Use    Smoking status: Never    Smokeless tobacco: Never   Vaping Use    Vaping status: Never Used   Substance and Sexual Activity    Alcohol use: Yes    Drug use: Never     Social Determinants of Health     Financial Resource Strain: Not on File (2022)    Received from [facility]    Financial Resource Strain     Financial Resource Strain: 0   Food Insecurity: Not on File (2022)    Received from [facility]    Food Insecurity     Food: 0   Transportation Needs: Not on File (2022)    Received from [facility]    Transportation Needs     Transportation: 0   Physical Activity: Not on File (2022)    Received from [facility]    Physical Activity     Physical Activity: 0   Stress: Not on File (2022)    Received from [facility]    Stress     Stress: 0   Social Connections: Not on File (2022)    Received from [facility]    Social Connections     Social Connections and Isolation: 0   Housing Stability: Not on File (2022)    Received from [facility]    Housing Stability     Housin       Safe relationship/safe home environment yes, lives with Father of her children     Depression Screening (PHQ-2/PHQ-9): Over the LAST 2 WEEKS   Little interest or pleasure in doing things (over the last two weeks)?: Not at all    Feeling down, depressed, or hopeless (over the last two weeks)?: Not at all    PHQ-2 SCORE: 0        Immunization History   Administered Date(s) Administered    Covid-19 Vaccine Moderna 100 mcg/0.5 ml 2021, 2021    DTAP 2014    FLUZONE 6 months and older PFS 0.5 ml (98583) 2021    Influenza 2014, 10/26/2023    TDAP 2021        Medications (Active prior to today's visit):  Current Outpatient Medications   Medication Sig Dispense Refill    teraconazole 0.4 % Vaginal Cream Place 1 applicator vaginally nightly for 7 days. 45 g 1    diclofenac 75 MG Oral Tab EC Take 1 tablet (75 mg total) by mouth 2 (two) times daily. (Patient not taking: Reported on 9/3/2024) 28 tablet 1    albuterol 108 (90 Base) MCG/ACT Inhalation Aero Soln Inhale 2 puffs into the lungs every 4 (four) hours as needed for Wheezing. (Patient not taking: Reported on 9/3/2024) 18 g 0       Allergies:  Allergies   Allergen Reactions    Cat Hair Extract Coughing, ITCHING, RASH and SHORTNESS OF BREATH    Mushrooms RASH     Around mouth       ROS:  Review of Systems   Constitutional: Negative.    HENT: Negative.     Eyes: Negative.    Respiratory: Negative.     Cardiovascular: Negative.    Gastrointestinal: Negative.    Genitourinary:  Positive for vaginal pain.   Musculoskeletal: Negative.    Skin: Negative.     Neurological: Negative.    Psychiatric/Behavioral: Negative.         PHYSICAL EXAM:  Vitals:    09/03/24 1158   BP: 129/81   Pulse: 61     Physical Exam  Vitals reviewed.   Constitutional:       Appearance: Normal appearance.   HENT:      Head: Normocephalic.   Cardiovascular:      Rate and Rhythm: Normal rate.   Pulmonary:      Effort: Pulmonary effort is normal.   Chest:   Breasts:     Right: Normal. No mass or tenderness.      Left: Normal. No mass or tenderness.   Genitourinary:     Pubic Area: No rash.       Labia:         Right: No lesion.         Left: No lesion.       Vagina: Vaginal discharge present. No bleeding.      Cervix: Friability present. No cervical motion tenderness.      Uterus: Normal. Not enlarged and not tender.       Adnexa:         Right: No mass or tenderness.          Left: No mass or tenderness.        Comments: Mild excoriation on lower labia/posterior vag wall.  Creamy yellow vag discharge, friable cervix  Lymphadenopathy:      Upper Body:      Right upper body: No axillary adenopathy.      Left upper body: No axillary adenopathy.      Lower Body: No right inguinal adenopathy. No left inguinal adenopathy.   Neurological:      Mental Status: She is alert and oriented to person, place, and time.   Psychiatric:         Mood and Affect: Mood normal.         Behavior: Behavior normal.          No results found for this or any previous visit (from the past 24 hour(s)).      ASSESSMENT/PLAN:     Racheal was seen today for gyn exam and annual.    Diagnoses and all orders for this visit:    Well woman exam with routine gynecological exam  -     ThinPrep PAP Smear; Future  -     Hpv Dna  High Risk , Thin Prep Collect; Future    General counseling and advice for contraceptive management    Routine screening for STI (sexually transmitted infection)  -     Chlamydia/GC PCR Combo; Future  -     Vaginitis Vaginosis PCR Panel; Future    Screening for cervical cancer  -     ThinPrep PAP Smear;  Future  -     Hpv Dna  High Risk , Thin Prep Collect; Future    Other orders  -     teraconazole 0.4 % Vaginal Cream; Place 1 applicator vaginally nightly for 7 days.        Last Pap: 8/5/21- NILM, due today  Next annual due: 1 year  Follow-up/Return to clinic: PRN    Counseling:   Best hygiene practices  Recommendations and rationale for COVID, Tdap, HPV, and flu shots  Contraceptive method(s), STI and HIV risk reduction/condom use  Emergency contraception reviewed  Intimate Partner Violence/recognition of sexual coercion and methods to resist and abstain as a choice  Frequency of health screening and personal risks  Pap, SBE/CBE, mammography  Smoking cessation/avoidance encouraged  Preconception counseling, including use of folic acid  Benefits of daily vitamin D, folic acid, and adequate fresh fruits and vegetables  Benefits of daily exercise      20 minutes face to face counseling, chart review, orders and coordination of care    Patient verbalized understanding, All questions answered. No barriers to learning identified    Brandan Willett, ISREAL

## 2024-09-03 ENCOUNTER — OFFICE VISIT (OUTPATIENT)
Dept: OBGYN CLINIC | Facility: CLINIC | Age: 29
End: 2024-09-03
Payer: COMMERCIAL

## 2024-09-03 VITALS
SYSTOLIC BLOOD PRESSURE: 129 MMHG | HEART RATE: 61 BPM | DIASTOLIC BLOOD PRESSURE: 81 MMHG | BODY MASS INDEX: 36.82 KG/M2 | WEIGHT: 215.69 LBS | HEIGHT: 64 IN

## 2024-09-03 DIAGNOSIS — Z30.09 GENERAL COUNSELING AND ADVICE FOR CONTRACEPTIVE MANAGEMENT: ICD-10-CM

## 2024-09-03 DIAGNOSIS — Z11.3 ROUTINE SCREENING FOR STI (SEXUALLY TRANSMITTED INFECTION): ICD-10-CM

## 2024-09-03 DIAGNOSIS — Z12.4 SCREENING FOR CERVICAL CANCER: ICD-10-CM

## 2024-09-03 DIAGNOSIS — Z01.419 WELL WOMAN EXAM WITH ROUTINE GYNECOLOGICAL EXAM: Primary | ICD-10-CM

## 2024-09-03 PROCEDURE — 99385 PREV VISIT NEW AGE 18-39: CPT | Performed by: ADVANCED PRACTICE MIDWIFE

## 2024-09-03 RX ORDER — TERCONAZOLE 0.4 %
1 CREAM WITH APPLICATOR VAGINAL NIGHTLY
Qty: 45 G | Refills: 1 | Status: SHIPPED | OUTPATIENT
Start: 2024-09-03 | End: 2024-09-10

## 2024-09-04 LAB
BV BACTERIA DNA VAG QL NAA+PROBE: NEGATIVE
C GLABRATA DNA VAG QL NAA+PROBE: NEGATIVE
C KRUSEI DNA VAG QL NAA+PROBE: NEGATIVE
C TRACH DNA SPEC QL NAA+PROBE: NEGATIVE
CANDIDA DNA VAG QL NAA+PROBE: NEGATIVE
HPV E6+E7 MRNA CVX QL NAA+PROBE: NEGATIVE
N GONORRHOEA DNA SPEC QL NAA+PROBE: NEGATIVE
T VAGINALIS DNA VAG QL NAA+PROBE: NEGATIVE

## 2024-12-10 ENCOUNTER — PATIENT MESSAGE (OUTPATIENT)
Dept: FAMILY MEDICINE CLINIC | Facility: CLINIC | Age: 29
End: 2024-12-10

## 2024-12-10 RX ORDER — ALBUTEROL SULFATE 90 UG/1
2 INHALANT RESPIRATORY (INHALATION) EVERY 4 HOURS PRN
Qty: 18 G | Refills: 0 | Status: CANCELLED
Start: 2024-12-10

## 2024-12-11 ENCOUNTER — TELEPHONE (OUTPATIENT)
Dept: FAMILY MEDICINE CLINIC | Facility: CLINIC | Age: 29
End: 2024-12-11

## 2024-12-11 NOTE — TELEPHONE ENCOUNTER
[facility] for the following concern:    Depression and trouble breathing

## 2024-12-11 NOTE — TELEPHONE ENCOUNTER
Left message to call back.    [employer] message sent.    Future Appointments   Date Time Provider Department Center   12/16/2024  5:00 PM Racheal Palomino MD [facility] [place]   12/17/2024  7:20 AM Juliana Wilson APRN [facility] [address]

## 2024-12-16 NOTE — TELEPHONE ENCOUNTER
See refill encounter from 12/10/24 for albuterol    Patient has read [employer] message.  Last read by Racheal Clemente at 11:05 AM on 12/13/2024.     Future Appointments   Date Time Provider Department Center   12/17/2024  7:20 AM Juliana Wilson APRN [facility] [address]

## 2024-12-16 NOTE — PROGRESS NOTES
[facility] new patient consult via video visit:    Subjective    This visit is conducted using Telemedicine with live, interactive video and audio.    HISTORY OF PRESENT ILLNESS    Racheal Clemente is a 29 year old female new to our office today for initiation of medical weight loss program.  Patient was referred by self.  Patient presents today with c/o excess weight since childhood.    Reason/goal for weight loss: lose 20# in 6 months and be at pre pregnancy weight in 1 year.    Previous weight loss efforts in the past: diet/meal prep and planning and exercise    Past 6 months lifestyle interventions: yes, caloric reduction    Reviewed [facility] patient contract- reviewed on line. L- 10, M- 10, S- 5.    Barriers to weight loss: stress eating, irregular meals    Wt Readings from Last 6 Encounters:   09/03/24 215 lb 11.2 oz (97.8 kg)   08/01/24 210 lb (95.3 kg)   05/07/24 213 lb (96.6 kg)   04/04/24 211 lb (95.7 kg)   03/21/24 211 lb (95.7 kg)   02/09/24 211 lb (95.7 kg)          Social hx and lifestyle reviewed:      How many meals do you eat out per week: not reported  Who is the primary cook in your home: patient    Please respond to the questions regarding your previous weight loss    How did you hear about the [facility]? Employed at [employer]   Previous weight loss efforts in the past/medication(s): Working out, meal planning   Eating behaviors/patterns that have been barriers to weight loss success in the past: Not eating   Please respond to the questions regarding a 24 hour food journal.  Include the average time you ate and the quantity/food preparation method.    List foods, qty and prep for breakfast: Granola bar and coffee   List foods, qty and prep for lunch. Lunchable   List foods, qty and prep for dinner. mexican food   List foods, qty and prep for snacks.    List the types and qty of fluids consumed Water   Please respond to the questions regarding lifestyle.     Tobacco use: No   Alcohol use: How many servings per week? 2   Supplements taken on a regular basis include: None   Please respond to the questions regarding exercise/activity    How many days per week are you active or exercise 0   What type of activities:    Perceived level of exertion on a scale of 1-5, with 5 being very intense: 5   Average stress level on a scale of 1-10, with 10 being extremely stressed: 10   How do you cope with stress: Sleep or alcohol   Please respond to the questions regarding sleep    How many hours of uninterrupted sleep do you get a night: 6   How many times do you wake up in the night: 2   Do you feel rested in the morning: No   Do you snore: No   Do you have sleep apnea: No   Do you use: None     Work: orthopedic MA with [employer]  Marital status: Boyfriend of 11 years with 2 children  Support: yes    MEDICAL HISTORY  PMH reviewed:   Cardiac disorders: elevated trigs  Depression/anxiety: negative  Glaucoma: negative  Kidney stones: negative  Eating disorder: negative  Migraines: negative  Seizures: negative  Joint-related conditions: negative  Liver disease: negative  Renal disease: negative  Diabetes: negative  Thyroid disease: negative  Constipation: negative  Other pertinent hx: heartburn  Sleep Apnea hx: negative  Cancer hx: negative  Cholecystectomy and/or gallstones: negative  Family or personal history of Pancreatic issues / Medullary Thyroid Cancer/MENS 2: negative   History of bariatric surgery: negative    FMH reviewed: obesity in parent/s or sibling: yes    REVIEW OF SYSTEMS  GENERAL: feels well otherwise  SKIN: denies any rashes to skin folds  HEENT: snoring- no  LUNGS: denies shortness of breath with exertion, no apnea  CARDIOVASCULAR: denies chest pain on exertion, denies palpitations or pedal edema  GI: denies abdominal pain.  No N/V/D/C  MUSCULOSKELETAL: denies joint pains  NEURO: denies headaches  PSYCH: denies change in behavior or mood, denies feeling sad  or depressed.    EXAM    Patient reported home weight: 212#. EMR VS dated 9/3/24 reviewed with reading of BP: 129/81, pulse: 61, weight: 215#, height: 5 feet, 4 inches, BMI: 37.01. LMP: 1 week ago. Calculated BMI today: 36.4    Physical Exam:  General: Patient speaking in full sentences.  alert, appears stated age, cooperative, no distress, and moderately obese  Resp: No increased work of breathing  Neuro: alert and oriented x3  Psych: patient appears cheerful, smiling, making good eye contact      Lab Results   Component Value Date    WBC 9.0 02/09/2024    RBC 4.87 02/09/2024    HGB 12.9 02/09/2024    HCT 38.7 02/09/2024    MCV 79.5 (L) 02/09/2024    MCH 26.5 02/09/2024    MCHC 33.3 02/09/2024    RDW 12.8 02/09/2024    .0 02/09/2024     Lab Results   Component Value Date     (H) 02/09/2024    BUN 13 02/09/2024    BUNCREA 19.1 02/09/2024    CREATSERUM 0.68 02/09/2024    ANIONGAP 9 02/09/2024    CA 9.3 02/09/2024    OSMOCALC 287 02/09/2024    ALKPHO 71 02/09/2024    AST 22 02/09/2024    ALT 31 02/09/2024    BILT 0.4 02/09/2024    TP 7.8 02/09/2024    ALB 4.4 02/09/2024    GLOBULIN 3.4 02/09/2024     02/09/2024    K 3.7 02/23/2024     02/09/2024    CO2 25.0 02/09/2024     Lab Results   Component Value Date     02/09/2024    A1C 5.6 02/09/2024     Lab Results   Component Value Date    CHOLEST 163 02/09/2024    TRIG 168 (H) 02/09/2024    HDL 52 02/09/2024    LDL 82 02/09/2024    VLDL 27 02/09/2024    NONHDLC 111 02/09/2024     Lab Results   Component Value Date    TSH 1.680 02/09/2024     Lab Results   Component Value Date    B12 478 02/09/2024     Lab Results   Component Value Date    VITD 13.8 (L) 02/09/2024       Medications Ordered Prior to Encounter[1]    ASSESSMENT  Initial Weight Data and Goal Weight Loss:       Diagnoses and all orders for this visit:    Encounter for therapeutic drug monitoring  -     Tirzepatide-Weight Management (ZEPBOUND) 2.5 MG/0.5ML Subcutaneous Solution  Auto-injector; Inject 2.5 mg into the skin once a week.  -     Tirzepatide-Weight Management (ZEPBOUND) 5 MG/0.5ML Subcutaneous Solution Auto-injector; Inject 5 mg into the skin once a week. Start after completing full 4 weeks on 2.5 mg weekly dose.    Class 2 severe obesity with serious comorbidity and body mass index (BMI) of 36.0 to 36.9 in adult, unspecified obesity type (HCC)  - Start Zepbound as directed  - Reviewed balanced plate nutrition with focus on whole food, regular meals daily that include protein and produce and eliminating/reducing late night eating.  - Counseled on the 4 Pillars of health (sleep, stress, nutrition and fitness).  - Reviewed weight synopsis in EMR   - Instructed to use contraception at all times while on AOMs.  -     OP REFERRAL TO DIETITIAN [facility] ([facility] USE ONLY)  -     Tirzepatide-Weight Management (ZEPBOUND) 2.5 MG/0.5ML Subcutaneous Solution Auto-injector; Inject 2.5 mg into the skin once a week.  -     Tirzepatide-Weight Management (ZEPBOUND) 5 MG/0.5ML Subcutaneous Solution Auto-injector; Inject 5 mg into the skin once a week. Start after completing full 4 weeks on 2.5 mg weekly dose.    Hypertriglyceridemia  -     OP REFERRAL TO [facility] ([facility] USE ONLY)  -     Tirzepatide-Weight Management (ZEPBOUND) 2.5 MG/0.5ML Subcutaneous Solution Auto-injector; Inject 2.5 mg into the skin once a week.  -     Tirzepatide-Weight Management (ZEPBOUND) 5 MG/0.5ML Subcutaneous Solution Auto-injector; Inject 5 mg into the skin once a week. Start after completing full 4 weeks on 2.5 mg weekly dose.    Insulin resistance  Comments:  Trig/HDL ratio 3.2, optimal goal <1.6  Orders:  -     OP REFERRAL TO [facility] ([facility] USE ONLY)  -     Tirzepatide-Weight Management (ZEPBOUND) 2.5 MG/0.5ML Subcutaneous Solution Auto-injector; Inject 2.5 mg into the skin once a week.  -     Tirzepatide-Weight Management (ZEPBOUND) 5 MG/0.5ML Subcutaneous Solution Auto-injector; Inject 5 mg into the skin  once a week. Start after completing full 4 weeks on 2.5 mg weekly dose.    Heartburn  -     OP REFERRAL TO [facility] ([facility] USE ONLY)    Vitamin D deficiency  -     OP REFERRAL TO [facility] ([facility] USE ONLY)        PLAN  Medication use and side effects reviewed with patient.  Medication contraindications: none foreseen.  Follow up with dietitian and psychologist as recommended.  Discussed the role of sleep and stress in weight management.  Reviewed labs in EMR  Counseled on comprehensive weight loss plan including attention to nutrition, exercise and behavior/stress management for success. See patient instruction below for more details.    Patient Instructions   Welcome to the [facility]...your Lifestyle Renovation begins now!  Thank you for placing your trust in our health care team, I look forward to working with you along this journey to better health!    Next steps:     1.  Call our office at [phone] to schedule a personal nutrition consultation with one of our registered dieticians, Alban or Becca. Bring along your food journal (3 days minimum). See journal options below.  2.  Use contraception at all times while on anti-obesity medications.  3.  Fill your prescribed medication and take as discussed and prescribed: Start Zepbound at 2.5 mg weekly. After 4 weeks increase to the next dose of 5 mg weekly. If <5# weight loss over the next 2 months then send Blaast message with current scale weight to determine if a dose adjustment is appropriate. Otherwise plant to maintain this dose until next visit. Any further dose titrations beyond this dose will be considered at appointments only, unless otherwise discussed. Visit the website [email or web address] and click on Consumers for additional details, saving and further dosing instrucitons. This medication may require a prior authorization (PA) by your insurance. A PA may take one week plus to complete and our office will  be in touch during this process if needed. If cost/supply prohibitive plan: contact office    Tips while taking an injectable weight loss medication:    Be an intuitive eater. Listen to your hunger and fullness signals, stopping when you are full.  Consume protein and produce in your day, striving for a rainbow of color of produce.  Reduce portions to staring size of 1 cup size and check in with your gut to see if you are full. Use a sand timer to slow down your eating pace to allow for 15-20 minutes to complete a meal and use the \"2 bite rule\".  Reduce refined sugars and high fat foods, as they may contribute to greater side effects of nausea and heartburn.  Stop eating 3 hours before bedtime to allow your food to digest.  Remain hydrated with water or non caloric and non caffeine beverages.  Use over the counter ricardo lozenge/supplement to help reduce nausea if needed.  If you have been off your medication for more than 2 weeks please notify our office to determine next dosing, as return to previous dose may not be appropriate or tolerated.    Please try to work on the following dietary changes this first month:    1.  Drink water with meals and throughout the day, cut down on soda and/or juice if consumed. Consider flavored water options like Bubbly, Spindrift, Hint and Sarai.  2.  Have protein with each meal, examples include: greek yogurt, cottage cheese, hard boiled egg, tofu, chicken, fish, or tuna.  3.  Work towards reducing/eliminating refined carbohydrates and sugars which includes items such as sweets, as well as rice, pasta, and bread and make sure to choose whole grain options when having them with just 1 serving per meal about the size of your inner palm.  4.  Consume non starchy veggies daily working towards making them a good 50% of your daily food intake. Add them to lunch and dinner consistently.  5.  Start a daily probiotic: VSL#3 is recommended, (order on line at www.vsl3.com). Take 1 capsule  daily with water for 30 days, then reduce to 1 every other day (this will reduce the cost). Capsules can be left out of refrigerator for 2 weeks. I recommend using a pill box weekly and keeping the bottle in the fridge.    Please download leida My Fitness Pal, LoseIt! Or Net Diary to monitor daily dietary intake and you will be able to see if you are eating the right amount of calories or too much or too little which would hinder weight loss. Additionally this will help to see your daily carbohydrate and protein intake. When you set the leida up choose 1-1.5 lbs/week as a goal.  Keeping a paper food journal is an option as well to remain accountable for your choices- this is the start to mindful eating! A low calorie diet has been consistently shown to support weight loss.    Continue or start exercising to help establish a routine. If not already exercising begin with 1 day/week and progress as able with the goal of working towards 30 minutes 5 days a week at a minimum. A variety or cardio, strength and stretching is important. Review resources below to help support you in building this healthy routine.    Meditation daily can help manage and control stress. Chronic stress can make weight loss difficult.  Exercising is one way to help with stress, but meditation using the CALM Leida or another comparable alternative can be done in your home or place of work with little time commitment. This Leida can also help work on behavior change and improve sleep. Check out the segment under Calm Masterclass and listen to The 4 Pillars of Health. A great way to begin learning about the foundation of lifestyle with practical tips to use in your every day. In addition, we offer counseling services and support for individual connection and care. A referral is necessary so please let me know if this is a service you are interested.    Check out www.yourweightmatters.org blog for continued support and education along your weight loss  journey to optimal health!      Patient Resources:    Personal Training/Fitness Classes/Health Coaching    [facility] in [place]: Full fitness center with group fitness and personal training located in [place].  Health Coaching with Carly iPnedo, Henrry Mueller, and Patel Lechuga at our [facility]- individual coaching to work on your health goals. Call [phone] and/or email @ [email or web address]. Free 60 minute consult when client of [employer] [facility].  [facility] @ http://www.Play It Interactive. A variety of group fitness options plus various yoga classes [phone] and/or email Carrie at [email or web address]  [facility] Centers with multiple locations: Yippee Arts (www.Tantaline), F45 Training (www.Edifilm), Localist Body Bootcamp (www.PurposeEnergy.Water Innovate), Blackstar Amplification (www.BDS.com.au), The Exercise  (www.exercisecoach.com), Club Pilates (www.IRX Therapeutics.Water Innovate)    Online Fitness  Fitness  on InfluAds  Fit in 10 DVD series   www.SolveBio  Chair exercises via Sit and Be Fit (www.sitandbefit.org) and Pollfish (www.SimpleSite.com) or Toni Arroyo or Enzo Schilling videos on YouScratchJrube.  Hip Hop Fit with Brian Chin at www.hiphopfit.fluid Operations    Apps for on the Go Fitness  [place] 7 Minute Workout (orange box with white 7) - free on the go HIIT training leida  Peloton Leida @ www.onepeloton.com    Nutrition Trackers and Programs  LoseIT! And My Fitness Pal apps and on line for tracking nutrition  NOOM - virtual health coaching  FitFoundation (healthy meals on the go) in [place] @ www.iaklpvqdrxnlj8j.Water Innovate  Ralph RODRIGUEZ @ Just Gotta Make It Advertising.bistromd.Water Innovate and Fmjkdz10 (calorie smart and low carb plans recommended) @ www.tflcqf45.com, Metabolic Meals @ www.Suagi.comMetabolicMeals.com - individual prepared meals to go  Gobble, Blue Apron, Home , Every Plate, Sunbasket- on line meal delivery programs for preparation at  home  Meal [facility] in [place] for homemade meals to go @ www.PoweredAnalytics  Diet Doctor @ www.dietdoctor.com - low carb swaps    Stress, Anxiety, Depression, Trauma  CALM meditation catie (www.calm.com)  Headspace  Don't let anxiety run your life. Using the science of emotion regulation and mindfulness to overcome fear and worry by Jaylan Mendez PsyD and Sam Butts MA.  The [facility] Podcast (September 27, 2023): 6 Magic Words That Stop Anxiety  What Happened to You?- a look at the impact trauma has on behavior written by Perez Asif and Dr. Issac Beard  Whole Again by Oswaldo Smiley - discovering your true self after trauma    Mindful Eating/The Hungry Brain  Am I Hungry? Mindful eating virtual  catie (www.amihungry.com)  The Hungry Brain by Shaunna Woo, PhD  Mindless Eating by Ajay Narvaez  Weight Loss Surgery Will Not Treat Food Addiction by Lorelei Navas Ph.D    Metabolic Dysfunction, Hormones and Cravings  Why We Get Sick? By Real Khalil (insulin resistance)  Your Body in Balance: The New Science of Food, Hormones, and Health by Dr. Tai Willingham  The Complete Guide to fasting by Dr. Wall  Fast Like a Girl by Dr. Day Schneider  The M Factor (documentary on PBS about Menopause)  Sugar, Salt & Fat by Esperanza Aguilar, Ph.D, R.D.  The Truth About Sugar - documentary on sugar (Free on Itineris, https://youtu.be/7C4jqllXB9c)  Presentation on SUGAR called Sugar: The Bitter Truth by Dr. Santo Weeks (Itineris) https://youtu.be/dBnniua6-oM?si=[email or web address]  Reverse Visceral Fat: #1 Way to Increase Your Lifespan & End Inflammation with Dr. Kirill Levine on Utube @ https://youtu.be/nupPRnvUpJY?si=[email or web address]    Nutrition Support  You Are What You Eat - Netfix series on twin study looking at impact of nutrition changes on health  The End of Dieting: How to Live for Life by Dr. Cedric Grewal M.D. or listen to The Informantonline Podcast Episode 63: Understanding \"Nutritarian\" Eating w/Dr. Steele  Carmina  The Game Changers- Netflix Documentary on plant based nutrition  The Dr. Nelson T5 Wellness Plan by Dr. Ruy Nelson MD  The Complete Guide to fasting by Dr. Wall  @[facility] ([facility] Dietician with support surrounding nutrition and meal prep/planning)    Education, Motivation and Support Resources  Live to 100: Secrets of the Blue Zones - Netflix series on the secrets to communities living over 100 years old  Atomic Habits by Yunier Barahona (a book about taking small steps to promote greater behavior change)   Motivation catie (black box with white \")- daily supportive messages sent to your phone  Can't Hurt Me by Jaylan Mejia (a book exploring the power of discipline in achieving your goals)  Fed Up - documentary about obesity (Free on Utube)  Www.yourweightmatters.org - Obesity Action Coalition sponsored Blog posts  Obesity Action Coalition Resources on topics specific to weight management (www.obesityaction.org)  Fitlosophy Fitspiration - journal to better health (journal book found at [employer] in fitness aisle)  Lisbeth Capps talk titled: The Call to Courage (Netflix)  The Exam Room by the Physician's Committee (Podcast)  Nutrition Facts by Dr. Aragon (Podcast)      Balanced Nutrition includes:     Build the mentality of Food 4 Fuel. Clean eating with whole foods and eliminating/reducing ultra processed foods.  Be an intuitive eater and using mindful eating practices.  Eat a balanced plate with protein and produce at all meals: 1/4 plate- protein, 1/2 plate non starchy veggies, and 1/4 plate fruit or complex carbohydrate.  Drink water with all meals and use a salad plate to naturally reduce portions.  Eliminate/reduce late night eating by stopping after 7pm. Allowing your body to fast for 12 hours (drink only water, tea or black coffee without any additives).            Sugar - It’s Not as Sweet as You Think    by Shana Calderón, RN, BSN, CBN  OAC Summer 2014 @  https://www.obesityaction.org/resources/sugar-its-not-as-sweet-as-you-think/    According to the [place] Department of Agriculture (USDA), dietary trends from 9311-8931, sugar consumption increased by 19 percent since 1970. Today, the average American consumes 20 teaspoons or 100 pounds of sugar each year! That amounts to 300 calories a day from sugar alone. And to make matters worse (you will read why later in this article), corn syrup consumption is on the rise, increasing by 387 percent in the same period of time. The largest amount of sugar is not being consumed in ingested fruits or other natural sugars. The largest amounts of consumed sugars are in the form of High Fructose Corn Syrup (HFCS) and brown sugar. Neither occurs naturally, and both are highly processed and in nearly every processed package food you will find in your local grocery store. Many of these foods you most likely would not suspect having sugar as an additive (see quiz at bottom of page).    What is sugar?  Sugar is a simple carbohydrate, which can either be a monosaccharide or disaccharide. Monosaccharides include glucose, fructose, and galactose. These three monosaccharides can join together to make the disaccharides maltose, sucrose, and lactose. These compounds are found in the foods we eat and are collectively called “sugar.”    The following are types of sugar and their natural source:    Most people associate the term “sugar” with the white sugar we put in coffee or iced tea. The human body uses glucose, the simplest unit of carbohydrate, as its primary fuel. Without adequate carbohydrate intake, our bodies will obtain glucose, or fuel, from another source. The possibilities include a breakdown of proteins we eat or proteins stored in our body, which may ultimately lead to muscle loss and affecting one’s metabolic rate or even malnutrition. However, our need is far below the current daily consumption.    Is sugar addictive? You  be the .  When high doses of sugar are consumed, it stimulates the release of dopamine in our brains. This response makes us feel pleasure (now you know why when you feel down or depressed you may want to overindulge in sweets). The drug morphine, cocaine and sugar all stimulate the same brain receptors. This study has been proven many times in lab rat studies.    In his new and fascinating book, Salt Sugar Fat: How the Food Giants Hooked Us, Pulitzer Prize-winning  Killian Diggs (I highly recommend this book) goes inside the world of processed and packaged foods. Dontae writes in detail about how the food industry (which is 17 percent of our economy) contributes to American’s obesity epidemic by infusing processed foods with sugar, salt, and fat to make it more addictive and pleasurable. You see now why so many continue to buy their products?    According to the [facility] for Food Policy and Obesity, the average child sees 5,500 food commercials a year that advertise high sugar breakfast cereals, fast food, soft drinks, candy and snacks. According to the Federal Trade Commission, the food industry spends $1.6 billion annually to reach children through the media, including the Internet.    What is high fructose corn syrup?  High fructose corn syrup (HFCS) is an industrial food product and not “natural” or a naturally occurring substance. It is extracted from corn stalks through a secret process. The sugars are extracted through a chemical enzymatic process resulting in HFCS.    Regular cane sugar (sucrose) is made of two-sugar molecules bound tightly together - glucose and fructose in equal amounts. The enzymes in your digestive tract must break down the sucrose into glucose and fructose, which are then absorbed into the body.    HFCS also consists of glucose and fructose, not in a 50-50 ratio, but a 55-45 fructose to glucose ratio in an unbound form. Fructose is sweeter than glucose. And HCFS is  cheaper than sugar. One of the reasons is because of the government farm bill corn subsidies. Products with HFCS are much sweeter and much cheaper than products made with cane sugar.    Sugar and HFCS’ Effect on the Body  Eating sugar has a systemic effect on your entire body including increased risk for diabetes, increased appetite, weight gain, heart and liver problems, decreased immune system, certain cancers and even your brain function to name a few.    Type 2 Diabetes  Type 1 Diabetes is when one’s pancreas does not make insulin. Type 2 Diabetes is when one’s body does not utilize insulin effectively. Type 1 Diabetes is usually diagnosed at a young age. Type 2 Diabetes used to occur in adulthood and was called “Adult Onset Diabetes,” however; it has since been renamed to Type 2 Diabetes because the onset is commonly seen at a much earlier age as the obesity epidemic increases. It has been estimated that just fewer than 2,000,000 individuals were diagnosed with Type 2 diabetes in 2010.    The pancreas acts on ingested sugar by secreting insulin. Insulin is a hormone that regulates the amount of sugar in the blood. If blood sugar gets too high or too low, it could be life-threatening. An increased amount of sugar in one’s diet causes the pancreas to secrete insulin. In some individuals, this leads to an overload on the pancreas and the development of Type 2 diabetes.    Sugar, Appetite & Weight Gain  Eating less sugar is linked with weight-loss, and eating more is linked with weight gain, according to a new review of published studies. The review lends support to the idea that advising people to limit the sugar in their diets may help lessen excess weight and obesity, the researchers conclude. “The really interesting finding is that increasing and decreasing sugar had virtually identical results (on weight), in the opposite direction of course,” says researcher WILFRED Baer, PhD, professor of human nutrition and  medicine at the [facility] in [facility].    According to leading nutritional expert, Clarence Aguilar MD, PhD, MPH, chair of nutrition at [employer] and author of Eat, Drink and Be Healthy, “Sugar increases body weight mainly by encouraging overeating.”    Heart and Liver Damage  A study published in the journal Hepatology in late 2012 found that consumption of fructose appears to affect the availability of the energy-transferring chemical ATP in the liver, thereby increasing the risk of liver cell malfunction and death.    In another review of HFCS, The American Journal of Clinical Nutrition, Odilon Leon, PhD, Department of Nutrition, [facility] explains that HFCS is absorbed more rapidly than regular sugar, and that it doesn’t stimulate insulin or leptin production. This prevents you from triggering the body’s signals for being full and may lead to overconsumption of total calories.    A 2012 paper in the journal Nature, brought forward the idea that limitations and warnings should be placed on sugar similar to warnings we see on alcohol. The authors showed evidence that fructose and glucose in excess can have a toxic effect on the liver as the metabolism of ethanol (the alcohol contained in alcoholic beverages) had similarities to the metabolic pathways of fructose.    Another published study in the Journal of Nutrition in 2012, found that children who consumed high levels of fructose had lower blood levels of cardiovascular protective compounds, such as HDL cholesterol and adiponectin. Higher consumption of fructose led to higher levels of fat around the midsection, a significant risk factor for diabetes and cardiovascular disease.    Immune System  Eliminating all sugar from a cancer patient’s diet would harm healthy cells that need energy to function. For example, many fruits contain high levels of antioxidants which are known to be effective in  fighting cancer; however, sugars that come from whole fruits are low in sugar. Plant-based nutrition is a benefit to our overall health including fighting or preventing cancers. These important antioxidants, phytochemicals, fiber, vitamins and minerals are found in these plant-based whole foods.    However, diets high in sugar and refined carbohydrates can lead to overweight and obesity, which indirectly increases cancer risk throughout time. Certain cancers including breast, prostate, colorectal and pancreatic are associated with obesity.    How can you avoid these unhealthy consequences of (often hidden) sugar?  The best way to avoid sugar is to not consume obvious foods that are loaded with sugar. However, as discussed in this article, there are many packaged foods that surprisingly have added sugar and the more health-damaging high fructose corn syrup.  Therefore…    Eat nature’s foods  Avoid processed food (I call these factory foods, not real foods.)  Don’t eat foods in packages (or dramatically decrease consumption)  Eat foods that rot  Eat foods that walked the earth, flew in the huber, swam in the ocean or grew in the soil     Beware!  Typically, the first ingredient on a label is the most prevalent in the food product; however, beware because there may be small amounts of many types of sugars, so none of them end up being in the first few ingredients (top 3) of the label. Sugar is disguised as a “healthy” ingredient, such as honey, rice syrup, or even “organic dehydrated cane juice.”    Here is a list of some of the possible “sugar” code words:    Corn sweetener  Corn syrup, or corn syrup solids  Dehydrated Cane Juice  Dextrin  Dextrose  Fructose  Fruit juice concentrate  Glucose  High-fructose corn syrup  Honey  Invert sugar  Lactose  Maltodextrin  Malt syrup  Maltose  Maple syrup  Molasses  Raw sugar  Rice syrup  Saccharose  Sorghum or sorghum syrup  Sucrose  Syrup  Treacle  Turbinado  sugar  Xylose    High Fructose Corn Sugar (HFCS) Quiz:    Which of the below listed foods contain High Fructose Corn Syrup?    Kraft Macaroni and Cheese  Stove Top Stuffing - Home style Herb  Isabell Sun Iced Tea  Ocean Spray Cranberry Juice  Wild Cherry Lifesavers  Robitussan Cough and Congestion  Wonderbread - White Bread  Smuckers Grape Jelly  Ridley’s Vegetable Soup  Mr & Mrs T Bloody Bhavya Mix  Nabisco Fig Newtons - Whole Grain  Nabisco Fig Newtons - Fat Free  Wishbone Classic Caesars’ Dressing  Chicken of the Sea White Tuna, [place]    Answer: All    Additional Resources:    The Truth About Sugar - documentary on sugar free on Utube @ https://youEXTRABANCAu.be/[email or web address]  SUGAR: The Bitter Truth by Dr. Santo Weeks (pediatric endocrinologist) - Lecture on sugar for free on  Utube @ https://youtu.be/dBnniua6-oM?[email or web address]      Holiday Weight and How to Avoid It    Obesity Action Coalition by Jes Martinez, PhD  https://www.obesityaction.org/resources/holiday-weight-and-rvp-ft-mcygz-it/      When we think about the holidays many things come to mind - gifts, shopping, parties, family, decorating, long to-do lists --and delicious holiday treats.    Strategies for Avoiding Holiday Weight Gain  The holiday season is a busy time, and there are eating opportunities everywhere we go, such as family gatherings, office parties with trays of home-baked treats in the lunchroom, holiday and [id number] programs at our kids’ schools, treat samples being given away as we make our way through the stores to do our holiday shopping and catalogs in our mailboxes with mouth-watering photo spreads on every page. We’re really busy, perhaps too busy to prepare the healthy meals we might otherwise prepare.    Here are a few tips that will help you negotiate this joyful time with minimum risk to your weight management goals:    Focus on maintaining your current weight. Challenging yourself to lose weight over the holidays is  setting yourself up for failure.  Don’t gorge on any special holiday food because you only get to eat it once a year. With luck, you’ll still be around to enjoy it next year. On the other hand, don’t deprive yourself of anything you want to taste. Instead, take a mindful bite, savoring the sight, taste, aroma, mouth feel and sound of each special holiday treat. Eating like this leads to increased pleasure, quicker satisfaction and decreased risk for weight gain.  Avoid the trap of thinking you can eat what you want because you can just start over in the New Year. It doesn’t get any easier just because it’s January - there are always other reasons to indulge and to celebrate.  Keep up your exercise routine. This will also help reduce holiday stress.  Keep tabs on yourself. Write down what you eat, weigh yourself if you want to or try on your favorite clothes to make sure they still fit.  Create meaning beyond the food by creating new traditions that have nothing to do with food. For example, change “in our family we always have chocolate cinnamon bread with whipped cream on Lolita morning” into “in our family we always play in the snow (or on the beach), or go for a long walk/take food and gifts to the homeless shelter on Lolita morning.”  Sometimes, eating a particular food is our way of remembering a lost loved one. If that applies to you, find another way to remember them, like sharing memories with family members.  Remember all the reasons why reaching and maintaining a healthy weight is important to you.  Remember, unless you’re an elite athlete, you’re unlikely to be able to “exercise off” weeks of overindulgence.  Strategies for Holiday Parties  Most of us love holiday parties and look forward to them all year. We get to dress up and go to nice places, spend time with our nearest and dearest, enjoy our favorite holiday music and engage in the traditions that are meaningful to us. Despite all of the  excitement, parties can also be minefields when it comes to honoring our healthy lifestyle goals. When we love parties, we may over-indulge as a way of intensifying the positive emotions we’re already feeling, and when we dislike parties, we may over-indulge in an effort to distract ourselves from the emotional discomfort that we’re feeling.    Here are a few tips that will help you get through holiday parties without sabotaging your goals:    Avoid wearing baggy clothing that allows you to expand as you eat.  After you’ve eaten, stay away from the food tables at the party.  Keep your hands busy by finding a way to help out. It’s the best way to distract yourself from the food.  Avoid alcohol. When we drink, we’re more likely to abandon healthy eating.  Fill up with water and other low-calorie drinks.  Take a healthy dish for the pot luck - something you can eat: consider salad, fruit, raw vegetables and a healthy dip.  Focus on your relationships, not on the food - learn to focus on enjoying the people and the special holiday experiences, on building special memories for yourself and your family.  Meeting new people is another good way of distracting yourself from the food. If you’re shy, simply be a good listener.  Plan ahead. The best kind of plan, when it comes to food, is about what you are going to eat - not about what you’re not going to eat. If we focus on what we can’t eat (or what we think we shouldn’t eat), this kind of thinking can set us up for failure because it simply leaves us feeling deprived.  Don’t arrive completely famished - you’ll be more likely to eat in a way you’ll later regret. Plan to eat on the light side both before and after the event. Think about your meal plan for the day, and leave yourself some room to eat at the party.  Coping with Holiday Stress  As you know, the holiday season can be joyful and stressful at the same time. There’s so much to do, being around family can sometimes  be difficult and often, we set ourselves the goal of creating the “perfect” holiday. Being stressed puts us at risk for stress-based eating in an effort to cope.    Here are some strategies you can use to reduce your stress levels:    Focus on what you’re grateful for.  Practice deep breathing whenever you feel overwhelmed.  Keep up your exercise routine.  Remind yourself to do just one thing at a time.  Remember -- you cannot do more than your best.  Be willing to say “no” to some events, tasks or requests. Sometimes this is the best way we can take care of ourselves.  Create a holiday season schedule for yourself. Schedule and prioritize everything you need to get done.  Reduce your expectations - aim for “good enough,” not “perfect.”  If you’re alone during the holidays, pamper yourself and find a way to help others who are less fortunate. This will help reduce your loneliness.  If your relationships with family members are strained, remember that over-indulging in your favorite holiday comfort foods is not going to change how they behave towards you!  Create fun times for yourself. Having fun is a great way of reducing stress!  I hope these tips will help you not just get through the holidays, but that they’ll allow you to feel reassured that you can still have a fun and meaningful time without having to sacrifice your weight management goals. Wishing you a happy, healthy and meaningful holiday season!        Return in about 3 months (around 3/17/2025) for weight management via clinic.    Patient verbalizes understanding.    IRIS Thacker  12/16/2024    DOCUMENTATION OF TIME SPENT: Code selection for this visit was based on time spent : 50 minutes on date of service in preparing to see the patient, obtaining and/or reviewing separately obtained history, performing a medically appropriate examination, counseling and educating the patient/family/caregiver, ordering medications or testing, referring and  communicating with other healthcare providers, documenting clinical information in the electronic medical record, independently interpreting results and communicating results to the patient/family/caregiver and care coordination with the patient's other providers.         [1]   No current outpatient medications on file prior to visit.     No current facility-administered medications on file prior to visit.

## 2024-12-17 ENCOUNTER — TELEMEDICINE (OUTPATIENT)
Dept: INTERNAL MEDICINE CLINIC | Facility: CLINIC | Age: 29
End: 2024-12-17
Payer: COMMERCIAL

## 2024-12-17 DIAGNOSIS — E88.819 INSULIN RESISTANCE: ICD-10-CM

## 2024-12-17 DIAGNOSIS — E66.812 CLASS 2 SEVERE OBESITY WITH SERIOUS COMORBIDITY AND BODY MASS INDEX (BMI) OF 36.0 TO 36.9 IN ADULT, UNSPECIFIED OBESITY TYPE (HCC): ICD-10-CM

## 2024-12-17 DIAGNOSIS — E78.1 HYPERTRIGLYCERIDEMIA: ICD-10-CM

## 2024-12-17 DIAGNOSIS — E66.01 CLASS 2 SEVERE OBESITY WITH SERIOUS COMORBIDITY AND BODY MASS INDEX (BMI) OF 36.0 TO 36.9 IN ADULT, UNSPECIFIED OBESITY TYPE (HCC): ICD-10-CM

## 2024-12-17 DIAGNOSIS — E55.9 VITAMIN D DEFICIENCY: ICD-10-CM

## 2024-12-17 DIAGNOSIS — R12 HEARTBURN: ICD-10-CM

## 2024-12-17 DIAGNOSIS — Z51.81 ENCOUNTER FOR THERAPEUTIC DRUG MONITORING: Primary | ICD-10-CM

## 2024-12-17 PROCEDURE — 99204 OFFICE O/P NEW MOD 45 MIN: CPT | Performed by: NURSE PRACTITIONER

## 2024-12-17 RX ORDER — TIRZEPATIDE 5 MG/.5ML
5 INJECTION, SOLUTION SUBCUTANEOUS WEEKLY
Qty: 2 ML | Refills: 2 | Status: SHIPPED | OUTPATIENT
Start: 2024-12-17

## 2024-12-17 RX ORDER — TIRZEPATIDE 2.5 MG/.5ML
2.5 INJECTION, SOLUTION SUBCUTANEOUS WEEKLY
Qty: 2 ML | Refills: 0 | Status: SHIPPED | OUTPATIENT
Start: 2024-12-17

## 2024-12-17 NOTE — PATIENT INSTRUCTIONS
Welcome to the [facility]...your Lifestyle Renovation begins now!  Thank you for placing your trust in our health care team, I look forward to working with you along this journey to better health!    Next steps:     1.  Call our office at [phone] to schedule a personal nutrition consultation with one of our registered dieticians, Alban Hudson. Bring along your food journal (3 days minimum). See journal options below.  2.  Use contraception at all times while on anti-obesity medications.  3.  Fill your prescribed medication and take as discussed and prescribed: Start Zepbound at 2.5 mg weekly. After 4 weeks increase to the next dose of 5 mg weekly. If <5# weight loss over the next 2 months then send Grata message with current scale weight to determine if a dose adjustment is appropriate. Otherwise plant to maintain this dose until next visit. Any further dose titrations beyond this dose will be considered at appointments only, unless otherwise discussed. Visit the website [email or web address] and click on Consumers for additional details, saving and further dosing instrucitons. This medication may require a prior authorization (PA) by your insurance. A PA may take one week plus to complete and our office will be in touch during this process if needed. If cost/supply prohibitive plan: contact office    Tips while taking an injectable weight loss medication:    Be an intuitive eater. Listen to your hunger and fullness signals, stopping when you are full.  Consume protein and produce in your day, striving for a rainbow of color of produce.  Reduce portions to staring size of 1 cup size and check in with your gut to see if you are full. Use a sand timer to slow down your eating pace to allow for 15-20 minutes to complete a meal and use the \"2 bite rule\".  Reduce refined sugars and high fat foods, as they may contribute to greater side effects of nausea and heartburn.  Stop eating 3  hours before bedtime to allow your food to digest.  Remain hydrated with water or non caloric and non caffeine beverages.  Use over the counter ricardo lozenge/supplement to help reduce nausea if needed.  If you have been off your medication for more than 2 weeks please notify our office to determine next dosing, as return to previous dose may not be appropriate or tolerated.    Please try to work on the following dietary changes this first month:    1.  Drink water with meals and throughout the day, cut down on soda and/or juice if consumed. Consider flavored water options like Bubbly, Spindrift, Hint and Sarai.  2.  Have protein with each meal, examples include: greek yogurt, cottage cheese, hard boiled egg, tofu, chicken, fish, or tuna.  3.  Work towards reducing/eliminating refined carbohydrates and sugars which includes items such as sweets, as well as rice, pasta, and bread and make sure to choose whole grain options when having them with just 1 serving per meal about the size of your inner palm.  4.  Consume non starchy veggies daily working towards making them a good 50% of your daily food intake. Add them to lunch and dinner consistently.  5.  Start a daily probiotic: VSL#3 is recommended, (order on line at www.vsl3.com). Take 1 capsule daily with water for 30 days, then reduce to 1 every other day (this will reduce the cost). Capsules can be left out of refrigerator for 2 weeks. I recommend using a pill box weekly and keeping the bottle in the fridge.    Please download catie My Fitness Pal, LoseIt! Or Net Diary to monitor daily dietary intake and you will be able to see if you are eating the right amount of calories or too much or too little which would hinder weight loss. Additionally this will help to see your daily carbohydrate and protein intake. When you set the catie up choose 1-1.5 lbs/week as a goal.  Keeping a paper food journal is an option as well to remain accountable for your choices- this is  the start to mindful eating! A low calorie diet has been consistently shown to support weight loss.    Continue or start exercising to help establish a routine. If not already exercising begin with 1 day/week and progress as able with the goal of working towards 30 minutes 5 days a week at a minimum. A variety or cardio, strength and stretching is important. Review resources below to help support you in building this healthy routine.    Meditation daily can help manage and control stress. Chronic stress can make weight loss difficult.  Exercising is one way to help with stress, but meditation using the CALM Leida or another comparable alternative can be done in your home or place of work with little time commitment. This Leida can also help work on behavior change and improve sleep. Check out the segment under Calm Masterclass and listen to The 4 Pillars of Health. A great way to begin learning about the foundation of lifestyle with practical tips to use in your every day. In addition, we offer counseling services and support for individual connection and care. A referral is necessary so please let me know if this is a service you are interested.    Check out www.yourweightmatters.org blog for continued support and education along your weight loss journey to optimal health!      Patient Resources:    Personal Training/Fitness Classes/Health Coaching    [facility] in [place]: Full fitness center with group fitness and personal training located in [place].  Health Coaching with Henrry Cadet, and Patel Lechuga at our [facility]- individual coaching to work on your health goals. Call [phone] and/or email @ [email or web address]. Free 60 minute consult when client of [employer] [facility].  POPEYE Milian @ [email or web address]://[email or web address] [email or web address]. A variety of group fitness options plus various yoga classes [phone] and/or email Carrie gao  [email or web address] [email or web address] [address].[facility]  [facility] Fitness Centers with multiple locations: [employer] Fitness (www.Qoture.Flexion), F45 Training (www.j86kpldxfth.Flexion), Kanmu Body Bootcamp (www.Digital Music IndiabodybootEnterMediap.Flexion), SMX (www.Cardinal Midstream.Flexion), The Exercise  (www.exercisecoach.com), Club Pilates (www.clubpilates.Flexion)    Online Fitness  Fitness  on Utube  Fit in 10 DVD series   www.amtzv21NFG.Flexion  Chair exercises via Sit and Be Fit (www.sitandbefit.org) and Secrette (www.Shicoh Engineering.com) or Toni Arroyo or Enzo Schilling videos on YouTube.  Hip Hop Fit with Brian Chin at www.hiphopfit.HemaSource    Apps for on the Go Fitness  [place] 7 Minute Workout (orange box with white 7) - free on the go HIIT training leida  Peloton Leida @ www.onepeloton.com    Nutrition Trackers and Programs  LoseIT! And My Fitness Pal apps and on line for tracking nutrition  NOOM - virtual health coaching  FitFoundation (healthy meals on the go) in [place] @ www.mxdrgpjeahprb4p.Flexion  Bischristian MD @ RF nanobistrMill33dDelta Systems and Lkdtwk45 (calorie smart and low carb plans recommended) @ www.pmscta81.com, Metabolic Meals @ www.SplashscoreMetabolicMeals.com - individual prepared meals to go  Gobble, Blue Apron, Home , Every Plate, Sunbasket- on line meal delivery programs for preparation at home  Meal [facility] in [place] for homemade meals to go @ www.mealvillage.Flexion  Diet Doctor @ www.dietdoctor.com - low carb swaps    Stress, Anxiety, Depression, Trauma  CALM meditation leida (www.calm.com)  Headspace  Don't let anxiety run your life. Using the science of emotion regulation and mindfulness to overcome fear and worry by Jaylan Mendez PsyD and Sam Butts MA.  The Two Tap Podcast (September 27, 2023): 6 Magic Words That Stop Anxiety  What Happened to You?- a look at the impact trauma has on behavior written by Perez Asif and Dr. Issac Beard  Whole Again by Oswaldo Smiley - discovering your true self after trauma    Mindful  Eating/The Hungry Brain  Am I Hungry? Mindful eating virtual  catie (www.amihungry.com)  The Hungry Brain by Shaunna Woo, PhD  Mindless Eating by Ajay Narvaez  Weight Loss Surgery Will Not Treat Food Addiction by Lorelei Navas Ph.D    Metabolic Dysfunction, Hormones and Cravings  Why We Get Sick? By Real Khalil (insulin resistance)  Your Body in Balance: The New Science of Food, Hormones, and Health by Dr. Tai Willingham  The Complete Guide to fasting by Dr. Wall  Fast Like a Girl by Dr. Day Schneider  The M Factor (documentary on PBS about Menopause)  Sugar, Salt & Fat by Esperanza Aguilar, Ph.D, R.D.  The Truth About Sugar - documentary on sugar (Free on Share Your Brain, https://Instahealthu.be/5M9yphkVH4b)  Presentation on SUGAR called Sugar: The Bitter Truth by Dr. Santo Weeks (Share Your Brain) [email or web address] [email or web address]/[email or web address]-oM?[email or web address]  Reverse Visceral Fat: #1 Way to Increase Your Lifespan & End Inflammation with Dr. Kirill Levine on Utube @ https://CryoXtract Instruments.be/nupPRnvUpJY?si=[email or web address]    Nutrition Support  You Are What You Eat - Netfix series on twin study looking at impact of nutrition changes on health  The End of Dieting: How to Live for Life by Dr. Cedric Grewal M.D. or listen to The OSOYOU.com Podcast Episode 63: Understanding \"Nutritarian\" Eating w/Dr. Cedric Grewal  The Game Changers- Netflix Documentary on plant based nutrition  The Dr. Nelson T5 Wellness Plan by Dr. Ruy Nelson MD  The Complete Guide to fasting by Dr. Wall  @[facility] ([facility] Dietician with support surrounding nutrition and meal prep/planning)    Education, Motivation and Support Resources  Live to 100: Secrets of the Blue Zones - Netflix series on the secrets to communities living over 100 years old  Atomic Habits by Yunier Barahona (a book about taking small steps to promote greater behavior change)   Motivation catie (black box with white \")- daily supportive messages sent to your phone  Can't Hurt Me by Jaylan Mejia (a book exploring the  power of discipline in achieving your goals)  Fed Up - documentary about obesity (Free on Utube)  Www.yourweightmatters.org - Obesity Action Coalition sponsored Blog posts  Obesity Action Coalition Resources on topics specific to weight management (www.obesityaction.org)  Fitlosophy Fitspiration - journal to better health (journal book found at [employer] in fitness aisle)  Lisbeth Capps talk titled: The Call to Courage (Netflix)  The Exam Room by the Physician's Committee (Podcast)  Nutrition Facts by Dr. Aragon (Podcast)      Balanced Nutrition includes:     Build the mentality of Food 4 Fuel. Clean eating with whole foods and eliminating/reducing ultra processed foods.  Be an intuitive eater and using mindful eating practices.  Eat a balanced plate with protein and produce at all meals: 1/4 plate- protein, 1/2 plate non starchy veggies, and 1/4 plate fruit or complex carbohydrate.  Drink water with all meals and use a salad plate to naturally reduce portions.  Eliminate/reduce late night eating by stopping after 7pm. Allowing your body to fast for 12 hours (drink only water, tea or black coffee without any additives).            Sugar - It’s Not as Sweet as You Think    by Shana Calderón RN, BSN, CBN  OAC Summer 2014 @ https://www.obesityaction.org/resources/sugar-its-not-as-sweet-as-you-think/    According to the United [place] Department of Agriculture (USDA), dietary trends from 3993-5101, sugar consumption increased by 19 percent since 1970. Today, the average American consumes 20 teaspoons or 100 pounds of sugar each year! That amounts to 300 calories a day from sugar alone. And to make matters worse (you will read why later in this article), corn syrup consumption is on the rise, increasing by 387 percent in the same period of time. The largest amount of sugar is not being consumed in ingested fruits or other natural sugars. The largest amounts of consumed sugars are in the form of High Fructose Corn Syrup (HFCS)  and brown sugar. Neither occurs naturally, and both are highly processed and in nearly every processed package food you will find in your local grocery store. Many of these foods you most likely would not suspect having sugar as an additive (see quiz at bottom of page).    What is sugar?  Sugar is a simple carbohydrate, which can either be a monosaccharide or disaccharide. Monosaccharides include glucose, fructose, and galactose. These three monosaccharides can join together to make the disaccharides maltose, sucrose, and lactose. These compounds are found in the foods we eat and are collectively called “sugar.”    The following are types of sugar and their natural source:    Most people associate the term “sugar” with the white sugar we put in coffee or iced tea. The human body uses glucose, the simplest unit of carbohydrate, as its primary fuel. Without adequate carbohydrate intake, our bodies will obtain glucose, or fuel, from another source. The possibilities include a breakdown of proteins we eat or proteins stored in our body, which may ultimately lead to muscle loss and affecting one’s metabolic rate or even malnutrition. However, our need is far below the current daily consumption.    Is sugar addictive? You be the .  When high doses of sugar are consumed, it stimulates the release of dopamine in our brains. This response makes us feel pleasure (now you know why when you feel down or depressed you may want to overindulge in sweets). The drug morphine, cocaine and sugar all stimulate the same brain receptors. This study has been proven many times in lab rat studies.    In his new and fascinating book, Salt Sugar Fat: How the Food Giants Hooked Us, Pulitzer Prize-winning  Killian Diggs (I highly recommend this book) goes inside the world of processed and packaged foods. Dontae writes in detail about how the food industry (which is 17 percent of our economy) contributes to American’s obesity  epidemic by infusing processed foods with sugar, salt, and fat to make it more addictive and pleasurable. You see now why so many continue to buy their products?    According to the [facility] for Food Policy and Obesity, the average child sees 5,500 food commercials a year that advertise high sugar breakfast cereals, fast food, soft drinks, candy and snacks. According to the Federal Trade Commission, the food industry spends $1.6 billion annually to reach children through the media, including the Internet.    What is high fructose corn syrup?  High fructose corn syrup (HFCS) is an industrial food product and not “natural” or a naturally occurring substance. It is extracted from corn stalks through a secret process. The sugars are extracted through a chemical enzymatic process resulting in HFCS.    Regular cane sugar (sucrose) is made of two-sugar molecules bound tightly together - glucose and fructose in equal amounts. The enzymes in your digestive tract must break down the sucrose into glucose and fructose, which are then absorbed into the body.    HFCS also consists of glucose and fructose, not in a 50-50 ratio, but a 55-45 fructose to glucose ratio in an unbound form. Fructose is sweeter than glucose. And HCFS is cheaper than sugar. One of the reasons is because of the government farm bill corn subsidies. Products with HFCS are much sweeter and much cheaper than products made with cane sugar.    Sugar and HFCS’ Effect on the Body  Eating sugar has a systemic effect on your entire body including increased risk for diabetes, increased appetite, weight gain, heart and liver problems, decreased immune system, certain cancers and even your brain function to name a few.    Type 2 Diabetes  Type 1 Diabetes is when one’s pancreas does not make insulin. Type 2 Diabetes is when one’s body does not utilize insulin effectively. Type 1 Diabetes is usually diagnosed at a young age. Type 2 Diabetes used to occur in  adulthood and was called “Adult Onset Diabetes,” however; it has since been renamed to Type 2 Diabetes because the onset is commonly seen at a much earlier age as the obesity epidemic increases. It has been estimated that just fewer than 2,000,000 individuals were diagnosed with Type 2 diabetes in 2010.    The pancreas acts on ingested sugar by secreting insulin. Insulin is a hormone that regulates the amount of sugar in the blood. If blood sugar gets too high or too low, it could be life-threatening. An increased amount of sugar in one’s diet causes the pancreas to secrete insulin. In some individuals, this leads to an overload on the pancreas and the development of Type 2 diabetes.    Sugar, Appetite & Weight Gain  Eating less sugar is linked with weight-loss, and eating more is linked with weight gain, according to a new review of published studies. The review lends support to the idea that advising people to limit the sugar in their diets may help lessen excess weight and obesity, the researchers conclude. “The really interesting finding is that increasing and decreasing sugar had virtually identical results (on weight), in the opposite direction of course,” says researcher WILFRED Baer, PhD, professor of human nutrition and medicine at the [facility]    According to leading nutritional expert, Clarence Aguilar MD, PhD, MPH, chair of nutrition at [employer] and author of Eat, Drink and Be Healthy, “Sugar increases body weight mainly by encouraging overeating.”    Heart and Liver Damage  A study published in the journal Hepatology in late 2012 found that consumption of fructose appears to affect the availability of the energy-transferring chemical ATP in the liver, thereby increasing the risk of liver cell malfunction and death.    In another review of HFCS, The American Journal of Clinical Nutrition, Odilon Leon, PhD, Department of Nutrition, [facility]  [place] explains that HFCS is absorbed more rapidly than regular sugar, and that it doesn’t stimulate insulin or leptin production. This prevents you from triggering the body’s signals for being full and may lead to overconsumption of total calories.    A 2012 paper in the journal Nature, brought forward the idea that limitations and warnings should be placed on sugar similar to warnings we see on alcohol. The authors showed evidence that fructose and glucose in excess can have a toxic effect on the liver as the metabolism of ethanol (the alcohol contained in alcoholic beverages) had similarities to the metabolic pathways of fructose.    Another published study in the Journal of Nutrition in 2012, found that children who consumed high levels of fructose had lower blood levels of cardiovascular protective compounds, such as HDL cholesterol and adiponectin. Higher consumption of fructose led to higher levels of fat around the midsection, a significant risk factor for diabetes and cardiovascular disease.    Immune System  Eliminating all sugar from a cancer patient’s diet would harm healthy cells that need energy to function. For example, many fruits contain high levels of antioxidants which are known to be effective in fighting cancer; however, sugars that come from whole fruits are low in sugar. Plant-based nutrition is a benefit to our overall health including fighting or preventing cancers. These important antioxidants, phytochemicals, fiber, vitamins and minerals are found in these plant-based whole foods.    However, diets high in sugar and refined carbohydrates can lead to overweight and obesity, which indirectly increases cancer risk throughout time. Certain cancers including breast, prostate, colorectal and pancreatic are associated with obesity.    How can you avoid these unhealthy consequences of (often hidden) sugar?  The best way to avoid sugar is to not consume obvious foods that are loaded  with sugar. However, as discussed in this article, there are many packaged foods that surprisingly have added sugar and the more health-damaging high fructose corn syrup.  Therefore…    Eat nature’s foods  Avoid processed food (I call these factory foods, not real foods.)  Don’t eat foods in packages (or dramatically decrease consumption)  Eat foods that rot  Eat foods that walked the earth, flew in the huber, swam in the ocean or grew in the soil     Beware!  Typically, the first ingredient on a label is the most prevalent in the food product; however, beware because there may be small amounts of many types of sugars, so none of them end up being in the first few ingredients (top 3) of the label. Sugar is disguised as a “healthy” ingredient, such as honey, rice syrup, or even “organic dehydrated cane juice.”    Here is a list of some of the possible “sugar” code words:    Corn sweetener  Corn syrup, or corn syrup solids  Dehydrated Cane Juice  Dextrin  Dextrose  Fructose  Fruit juice concentrate  Glucose  High-fructose corn syrup  Honey  Invert sugar  Lactose  Maltodextrin  Malt syrup  Maltose  Maple syrup  Molasses  Raw sugar  Rice syrup  Saccharose  Sorghum or sorghum syrup  Sucrose  Syrup  Treacle  Turbinado sugar  Xylose    High Fructose Corn Sugar (HFCS) Quiz:    Which of the below listed foods contain High Fructose Corn Syrup?    Kraft Macaroni and Cheese  Stove Top Stuffing - Home style Herb  Isabell Sun Iced Tea  Ocean Spray Cranberry Juice  Wild Cherry Lifesavers  Robitussan Cough and Congestion  Wonderbread - White Bread  Smuckers Grape Jelly  Ridley’s Vegetable Soup  Mr & Mrs T Bloody Bhavya Mix  Nabisco Fig Newtons - Whole Grain  Nabisco Fig Newtons - Fat Free  Wishbone Classic Caesars’ Dressing  Chicken of the Sea White Tuna, Spring Water    Answer: All    Additional Resources:    The Truth About Sugar - documentary on sugar free on [employer] @ https://youtu.be/0M5qtkuRL0n  SUGAR: The Bitter Truth by   Santo Weeks (pediatric endocrinologist) - Lecture on sugar for free on  Utube @ https://youtu.be/dBnniua6-oM?si=[email or web address]      Holiday Weight and How to Avoid It    Obesity Action Coalition by Jes Martinez, PhD  https://www.obesityaction.org/resources/holiday-weight-and-gvr-dp-rpnmu-it/      When we think about the holidays many things come to mind - gifts, shopping, parties, family, decorating, long to-do lists --and delicious holiday treats.    Strategies for Avoiding Holiday Weight Gain  The holiday season is a busy time, and there are eating opportunities everywhere we go, such as family gatherings, office parties with trays of home-baked treats in the lunchroom, holiday and otl-bk-knperkdq programs at our kids’ schools, treat samples being given away as we make our way through the stores to do our holiday shopping and catalogs in our mailboxes with mouth-watering photo spreads on every page. We’re really busy, perhaps too busy to prepare the healthy meals we might otherwise prepare.    Here are a few tips that will help you negotiate this joyful time with minimum risk to your weight management goals:    Focus on maintaining your current weight. Challenging yourself to lose weight over the holidays is setting yourself up for failure.  Don’t gorge on any special holiday food because you only get to eat it once a year. With luck, you’ll still be around to enjoy it next year. On the other hand, don’t deprive yourself of anything you want to taste. Instead, take a mindful bite, savoring the sight, taste, aroma, mouth feel and sound of each special holiday treat. Eating like this leads to increased pleasure, quicker satisfaction and decreased risk for weight gain.  Avoid the trap of thinking you can eat what you want because you can just start over in the New Year. It doesn’t get any easier just because it’s January - there are always other reasons to indulge and to celebrate.  Keep up your exercise  routine. This will also help reduce holiday stress.  Keep tabs on yourself. Write down what you eat, weigh yourself if you want to or try on your favorite clothes to make sure they still fit.  Create meaning beyond the food by creating new traditions that have nothing to do with food. For example, change “in our family we always have chocolate cinnamon bread with whipped cream on [place] morning” into “in our family we always play in the snow (or on the beach), or go for a long walk/take food and gifts to the homeless shelter on [place] morning.”  Sometimes, eating a particular food is our way of remembering a lost loved one. If that applies to you, find another way to remember them, like sharing memories with family members.  Remember all the reasons why reaching and maintaining a healthy weight is important to you.  Remember, unless you’re an elite athlete, you’re unlikely to be able to “exercise off” weeks of overindulgence.  Strategies for Holiday Parties  Most of us love holiday parties and look forward to them all year. We get to dress up and go to nice places, spend time with our nearest and dearest, enjoy our favorite holiday music and engage in the traditions that are meaningful to us. Despite all of the excitement, parties can also be minefields when it comes to honoring our healthy lifestyle goals. When we love parties, we may over-indulge as a way of intensifying the positive emotions we’re already feeling, and when we dislike parties, we may over-indulge in an effort to distract ourselves from the emotional discomfort that we’re feeling.    Here are a few tips that will help you get through holiday parties without sabotaging your goals:    Avoid wearing baggy clothing that allows you to expand as you eat.  After you’ve eaten, stay away from the food tables at the party.  Keep your hands busy by finding a way to help out. It’s the best way to distract yourself from the food.  Avoid alcohol. When we  drink, we’re more likely to abandon healthy eating.  Fill up with water and other low-calorie drinks.  Take a healthy dish for the pot luck - something you can eat: consider salad, fruit, raw vegetables and a healthy dip.  Focus on your relationships, not on the food - learn to focus on enjoying the people and the special holiday experiences, on building special memories for yourself and your family.  Meeting new people is another good way of distracting yourself from the food. If you’re shy, simply be a good listener.  Plan ahead. The best kind of plan, when it comes to food, is about what you are going to eat - not about what you’re not going to eat. If we focus on what we can’t eat (or what we think we shouldn’t eat), this kind of thinking can set us up for failure because it simply leaves us feeling deprived.  Don’t arrive completely famished - you’ll be more likely to eat in a way you’ll later regret. Plan to eat on the light side both before and after the event. Think about your meal plan for the day, and leave yourself some room to eat at the party.  Coping with Holiday Stress  As you know, the holiday season can be joyful and stressful at the same time. There’s so much to do, being around family can sometimes be difficult and often, we set ourselves the goal of creating the “perfect” holiday. Being stressed puts us at risk for stress-based eating in an effort to cope.    Here are some strategies you can use to reduce your stress levels:    Focus on what you’re grateful for.  Practice deep breathing whenever you feel overwhelmed.  Keep up your exercise routine.  Remind yourself to do just one thing at a time.  Remember -- you cannot do more than your best.  Be willing to say “no” to some events, tasks or requests. Sometimes this is the best way we can take care of ourselves.  Create a holiday season schedule for yourself. Schedule and prioritize everything you need to get done.  Reduce your expectations - aim  for “good enough,” not “perfect.”  If you’re alone during the holidays, pamper yourself and find a way to help others who are less fortunate. This will help reduce your loneliness.  If your relationships with family members are strained, remember that over-indulging in your favorite holiday comfort foods is not going to change how they behave towards you!  Create fun times for yourself. Having fun is a great way of reducing stress!  I hope these tips will help you not just get through the holidays, but that they’ll allow you to feel reassured that you can still have a fun and meaningful time without having to sacrifice your weight management goals. Wishing you a happy, healthy and meaningful holiday season!

## 2024-12-25 ENCOUNTER — PATIENT MESSAGE (OUTPATIENT)
Dept: INTERNAL MEDICINE CLINIC | Facility: CLINIC | Age: 29
End: 2024-12-25

## 2025-01-16 ENCOUNTER — PATIENT MESSAGE (OUTPATIENT)
Dept: INTERNAL MEDICINE CLINIC | Facility: CLINIC | Age: 30
End: 2025-01-16

## 2025-01-16 DIAGNOSIS — E78.1 HYPERTRIGLYCERIDEMIA: ICD-10-CM

## 2025-01-16 DIAGNOSIS — E88.819 INSULIN RESISTANCE: ICD-10-CM

## 2025-01-16 DIAGNOSIS — Z51.81 ENCOUNTER FOR THERAPEUTIC DRUG MONITORING: ICD-10-CM

## 2025-01-16 DIAGNOSIS — E66.01 CLASS 2 SEVERE OBESITY WITH SERIOUS COMORBIDITY AND BODY MASS INDEX (BMI) OF 36.0 TO 36.9 IN ADULT, UNSPECIFIED OBESITY TYPE (HCC): ICD-10-CM

## 2025-01-16 DIAGNOSIS — E66.812 CLASS 2 SEVERE OBESITY WITH SERIOUS COMORBIDITY AND BODY MASS INDEX (BMI) OF 36.0 TO 36.9 IN ADULT, UNSPECIFIED OBESITY TYPE (HCC): ICD-10-CM

## 2025-01-17 RX ORDER — TIRZEPATIDE 5 MG/.5ML
5 INJECTION, SOLUTION SUBCUTANEOUS WEEKLY
Qty: 2 ML | Refills: 2 | Status: SHIPPED | OUTPATIENT
Start: 2025-01-17

## 2025-01-17 NOTE — TELEPHONE ENCOUNTER
Zepbound 5 mg sent to [facility] today  Pa entered in epic - pending approval    It is approved!  Prior Authorization History  Tirzepatide-Weight Management (ZEPBOUND) 5 MG/0.5ML Subcutaneous Solution Auto-injector     Approval Details    Authorized from December 18, 2024 to September 14, 2025  Information received electronically from payer

## 2025-02-10 ENCOUNTER — PATIENT MESSAGE (OUTPATIENT)
Dept: INTERNAL MEDICINE CLINIC | Facility: CLINIC | Age: 30
End: 2025-02-10

## 2025-02-11 ENCOUNTER — HOSPITAL ENCOUNTER (OUTPATIENT)
Age: 30
Discharge: HOME | End: 2025-02-11
Payer: COMMERCIAL

## 2025-02-11 ENCOUNTER — OFFICE VISIT (OUTPATIENT)
Dept: FAMILY MEDICINE CLINIC | Facility: CLINIC | Age: 30
End: 2025-02-11
Payer: COMMERCIAL

## 2025-02-11 VITALS
RESPIRATION RATE: 16 BRPM | TEMPERATURE: 99 F | SYSTOLIC BLOOD PRESSURE: 99 MMHG | DIASTOLIC BLOOD PRESSURE: 68 MMHG | BODY MASS INDEX: 33 KG/M2 | OXYGEN SATURATION: 100 % | HEART RATE: 77 BPM | WEIGHT: 193 LBS

## 2025-02-11 VITALS
OXYGEN SATURATION: 98 % | SYSTOLIC BLOOD PRESSURE: 142 MMHG | HEART RATE: 89 BPM | DIASTOLIC BLOOD PRESSURE: 94 MMHG | TEMPERATURE: 98 F | RESPIRATION RATE: 20 BRPM

## 2025-02-11 DIAGNOSIS — E87.6 HYPOKALEMIA: ICD-10-CM

## 2025-02-11 DIAGNOSIS — R19.7 DIARRHEA, UNSPECIFIED TYPE: Primary | ICD-10-CM

## 2025-02-11 LAB
#MXD IC: 0.7 X10ˆ3/UL (ref 0.1–1)
BUN BLD-MCNC: 10 MG/DL (ref 7–18)
CHLORIDE BLD-SCNC: 103 MMOL/L (ref 98–112)
CO2 BLD-SCNC: 24 MMOL/L (ref 21–32)
CREAT BLD-MCNC: 0.7 MG/DL
EGFRCR SERPLBLD CKD-EPI 2021: 119 ML/MIN/1.73M2 (ref 60–?)
GLUCOSE BLD-MCNC: 86 MG/DL (ref 70–99)
HCT VFR BLD AUTO: 38.9 %
HCT VFR BLD CALC: 40 %
HGB BLD-MCNC: 13.3 G/DL
ISTAT IONIZED CALCIUM FOR CHEM 8: 1.17 MMOL/L (ref 1.12–1.32)
LYMPHOCYTES # BLD AUTO: 2.1 X10ˆ3/UL (ref 1–4)
LYMPHOCYTES NFR BLD AUTO: 29.4 %
MCH RBC QN AUTO: 27.3 PG (ref 26–34)
MCHC RBC AUTO-ENTMCNC: 34.2 G/DL (ref 31–37)
MCV RBC AUTO: 79.9 FL (ref 80–100)
MIXED CELL %: 9.7 %
NEUTROPHILS # BLD AUTO: 4.2 X10ˆ3/UL (ref 1.5–7.7)
NEUTROPHILS NFR BLD AUTO: 60.9 %
PLATELET # BLD AUTO: 259 X10ˆ3/UL (ref 150–450)
POTASSIUM BLD-SCNC: 3.4 MMOL/L (ref 3.6–5.1)
RBC # BLD AUTO: 4.87 X10ˆ6/UL
SODIUM BLD-SCNC: 140 MMOL/L (ref 136–145)
WBC # BLD AUTO: 7 X10ˆ3/UL (ref 4–11)

## 2025-02-11 PROCEDURE — 80047 BASIC METABLC PNL IONIZED CA: CPT

## 2025-02-11 PROCEDURE — 85025 COMPLETE CBC W/AUTO DIFF WBC: CPT | Performed by: NURSE PRACTITIONER

## 2025-02-11 PROCEDURE — 99204 OFFICE O/P NEW MOD 45 MIN: CPT

## 2025-02-11 PROCEDURE — 99214 OFFICE O/P EST MOD 30 MIN: CPT

## 2025-02-11 PROCEDURE — 36415 COLL VENOUS BLD VENIPUNCTURE: CPT

## 2025-02-11 RX ORDER — DICYCLOMINE HCL 20 MG
20 TABLET ORAL 4 TIMES DAILY PRN
Qty: 30 TABLET | Refills: 0 | Status: SHIPPED | OUTPATIENT
Start: 2025-02-11 | End: 2025-03-13

## 2025-02-11 RX ORDER — DICYCLOMINE HYDROCHLORIDE 10 MG/5ML
20 SOLUTION ORAL ONCE
Status: COMPLETED | OUTPATIENT
Start: 2025-02-11 | End: 2025-02-11

## 2025-02-11 NOTE — ED PROVIDER NOTES
Patient Seen in: [facility]      History     Chief Complaint   Patient presents with    Diarrhea     Stated Complaint: abdominal pain/diarrhea    Subjective:   HPI      29-year-old female presents today with complaints of continuing diarrhea for 2 weeks.  Patient thought that 2 weeks ago she had food poisoning but she continues to have diarrhea since.  Patient states she has been taking over-the-counter Pepto-Bismol with no relief.    Objective:     History reviewed. No pertinent past medical history.           History reviewed. No pertinent surgical history.             Social History     Socioeconomic History    Marital status: Single   Tobacco Use    Smoking status: Never    Smokeless tobacco: Never   Vaping Use    Vaping status: Never Used   Substance and Sexual Activity    Alcohol use: Yes    Drug use: Never     Social Drivers of Health     Food Insecurity: Not on File (2024)    Received from [facility]    Food Insecurity     Food: 0   Transportation Needs: Not on File (2022)    Received from [facility]    Transportation Needs     Transportation: 0   Housing Stability: Not on File (2022)    Received from [facility]    Housing Stability     Housin              Review of Systems   Constitutional: Negative.    HENT: Negative.     Eyes: Negative.    Respiratory: Negative.     Cardiovascular: Negative.    Gastrointestinal:  Positive for diarrhea.   Endocrine: Negative.    Genitourinary: Negative.    Musculoskeletal: Negative.    Skin: Negative.    Allergic/Immunologic: Negative.    Neurological: Negative.    Hematological: Negative.    Psychiatric/Behavioral: Negative.         Positive for stated complaint: abdominal pain/diarrhea  Other systems are as noted in HPI.  Constitutional and vital signs reviewed.      All other systems reviewed and negative except as noted above.    Physical Exam     ED Triage Vitals [25 1158]   BP (!) 142/94   Pulse 89   Resp 20   Temp 98 °F  (36.7 °C)   Temp src Oral   SpO2 98 %   O2 Device        Current Vitals:   Vital Signs  BP: (!) 142/94  Pulse: 89  Resp: 20  Temp: 98 °F (36.7 °C)  Temp src: Oral    Oxygen Therapy  SpO2: 98 %        Physical Exam  Vitals and nursing note reviewed.   Constitutional:       Appearance: Normal appearance.   HENT:      Head: Normocephalic.      Right Ear: External ear normal.      Left Ear: External ear normal.      Mouth/Throat:      Mouth: Mucous membranes are moist.      Pharynx: Oropharynx is clear.   Eyes:      Extraocular Movements: Extraocular movements intact.      Conjunctiva/sclera: Conjunctivae normal.      Pupils: Pupils are equal, round, and reactive to light.   Cardiovascular:      Rate and Rhythm: Normal rate and regular rhythm.      Pulses: Normal pulses.      Heart sounds: Normal heart sounds.   Pulmonary:      Effort: Pulmonary effort is normal.      Breath sounds: Normal breath sounds.   Abdominal:      General: Abdomen is flat.      Palpations: Abdomen is soft.      Tenderness: There is no abdominal tenderness. There is no guarding.      Comments: Slightly hyperactive bowel sounds appreciated in all 4 quadrants.   Musculoskeletal:      Cervical back: Normal range of motion and neck supple.   Neurological:      General: No focal deficit present.      Mental Status: She is alert.   Psychiatric:         Mood and Affect: Mood normal.          ED Course     Labs Reviewed   POCT CBC - Abnormal; Notable for the following components:       Result Value    MCV IC 79.9 (*)     All other components within normal limits   POCT ISTAT CHEM8 CARTRIDGE - Abnormal; Notable for the following components:    ISTAT Potassium 3.4 (*)     All other components within normal limits   C. DIFFICILE(TOXIGENIC)PCR   OCCULT BLOOD, STOOL   STOOL CULTURE W/SHIGATOXIN                   MDM      29-year-old female presents today with complaints of continuing diarrhea for 2 weeks.  Patient thought that 2 weeks ago she had food  poisoning but she continues to have diarrhea since.  Patient states she has been taking over-the-counter Pepto-Bismol with no relief.  Vital signs: Please see EMR.  Physical exam: Please see exam.  Differential diagnosis: Viral gastroenteritis, dehydration, electrolyte abnormality.  Recent Results (from the past 24 hours)   POCT CBC    Collection Time: 02/11/25 12:12 PM   Result Value Ref Range    WBC IC 7.0 4.0 - 11.0 x10ˆ3/uL    RBC IC 4.87 3.80 - 5.30 X10ˆ6/uL    HGB IC 13.3 12.0 - 16.0 g/dL    HCT IC 38.9 35.0 - 48.0 %    MCV IC 79.9 (L) 80.0 - 100.0 fL    MCH IC 27.3 26.0 - 34.0 pg    MCHC IC 34.2 31.0 - 37.0 g/dL    PLT .0 150.0 - 450.0 X10ˆ3/uL    # Neutrophil 4.2 1.5 - 7.7 X10ˆ3/uL    # Lymphocyte 2.1 1.0 - 4.0 X10ˆ3/uL    # Mixed Cells 0.7 0.1 - 1.0 X10ˆ3/uL    Neutrophil % 60.9 %    Lymphocyte % 29.4 %    Mixed Cell % 9.7 %   POCT ISTAT chem8 cartridge    Collection Time: 02/11/25 12:18 PM   Result Value Ref Range    ISTAT Sodium 140 136 - 145 mmol/L    ISTAT BUN 10 7 - 18 mg/dL    ISTAT Potassium 3.4 (L) 3.6 - 5.1 mmol/L    ISTAT Chloride 103 98 - 112 mmol/L    ISTAT Ionized Calcium 1.17 1.12 - 1.32 mmol/L    ISTAT Hematocrit 40 34 - 50 %    ISTAT Glucose 86 70 - 99 mg/dL    ISTAT TCO2 24 21 - 32 mmol/L    ISTAT Creatinine 0.70 0.55 - 1.02 mg/dL    eGFR-Cr 119 >=60 mL/min/1.73m2   Will diagnose with diarrhea.  Will prescribe Bentyl as needed.  Patient will bring back stool sample at her soonest convenience and we will notify her of any abnormalities with that stool panel that indicates the need to change treatment plan.  Patient instructed to increase her electrolyte rich fluids.  ED precautions given.        Medical Decision Making  29-year-old female presents today with complaints of continuing diarrhea for 2 weeks.  Patient thought that 2 weeks ago she had food poisoning but she continues to have diarrhea since.  Patient states she has been taking over-the-counter Pepto-Bismol with no  relief.    Problems Addressed:  Diarrhea, unspecified type: acute illness or injury  Hypokalemia: acute illness or injury    Amount and/or Complexity of Data Reviewed  Labs: ordered. Decision-making details documented in ED Course.  ECG/medicine tests: ordered. Decision-making details documented in ED Course.    Risk  Prescription drug management.        Disposition and Plan     Clinical Impression:  1. Diarrhea, unspecified type    2. Hypokalemia         Disposition:  Discharge  2/11/2025 12:37 pm    Follow-up:  Nadia Robles DO  [address] [facility]  [facility] [address]  [phone]    In 1 week            Medications Prescribed:  Current Discharge Medication List        START taking these medications    Details   dicyclomine 20 MG Oral Tab Take 1 tablet (20 mg total) by mouth 4 (four) times daily as needed.  Qty: 30 tablet, Refills: 0                 Supplementary Documentation:

## 2025-02-11 NOTE — ED INITIAL ASSESSMENT (HPI)
Presents for diarrhea for 1 week. Denies abdominal pain, nausea, no vomiting. No fevers. Denies blood in stool. Only mild stomach cramping with diarrhea. Was sent from [facility].

## 2025-02-11 NOTE — PROGRESS NOTES
Patient presents with one week history of diarrhea. Notes diarrhea several times daily, waking from sleep at start of illness. Notes bloating that improves after passing loose stool. Denies fever, nausea, vomiting. Urinating normally  No past medical history on file.  No past surgical history on file.  Medications Ordered Prior to Encounter[1]    BP 99/68   Pulse 77   Temp 98.6 °F (37 °C) (Oral)   Resp 16   Wt 193 lb (87.5 kg)   LMP 01/13/2025 (Approximate)   SpO2 100%   BMI 33.13 kg/m²     GENERAL: well developed, well nourished,in no apparent distress  HEENT: PERLLA, conjunctiva clear, atraumatic, normocephalic,  CARDIO: RRR without murmur  GI: good BS's, generalized uncomfortable with palpation. No palpable masses or organomegaly  MUSCULOSKELETAL: no laxity of joints noted, normal gait  EXTREMITIES: no cyanosis, clubbing or edema  NEURO: CN 2-12 grossly intact  Accompanied by: self  After triage, higher acuity of care was recommended to Racheal jacob.   Rationale: Need for further evaluation and management outside the scope of practice for the walk in clinic  Site recommendation: [facility] for evaluation.  Patient/parent verbalized understanding of rationale for further evaluation and was stable upon discharge. Will proceed there now for evaluation.           [1]   Current Outpatient Medications on File Prior to Visit   Medication Sig Dispense Refill    Tirzepatide-Weight Management (ZEPBOUND) 5 MG/0.5ML Subcutaneous Solution Auto-injector Inject 5 mg into the skin once a week. Start after completing full 4 weeks on 2.5 mg weekly dose. 2 mL 2    Tirzepatide-Weight Management (ZEPBOUND) 2.5 MG/0.5ML Subcutaneous Solution Auto-injector Inject 2.5 mg into the skin once a week. (Patient not taking: Reported on 2/11/2025) 2 mL 0     No current facility-administered medications on file prior to visit.

## 2025-02-11 NOTE — DISCHARGE INSTRUCTIONS
Please return your stool sample at your soonest convenience and we will notify you of any abnormalities with that that indicate a need to change treatment plan.  Please increase your electrolyte rich fluids.

## 2025-02-12 ENCOUNTER — HOSPITAL ENCOUNTER (EMERGENCY)
Facility: HOSPITAL | Age: 30
Discharge: HOME | End: 2025-02-12
Attending: EMERGENCY MEDICINE
Payer: COMMERCIAL

## 2025-02-12 VITALS
HEART RATE: 72 BPM | RESPIRATION RATE: 16 BRPM | BODY MASS INDEX: 32.43 KG/M2 | SYSTOLIC BLOOD PRESSURE: 146 MMHG | OXYGEN SATURATION: 99 % | DIASTOLIC BLOOD PRESSURE: 86 MMHG | WEIGHT: 183 LBS | TEMPERATURE: 98 F | HEIGHT: 63 IN

## 2025-02-12 DIAGNOSIS — E87.6 HYPOKALEMIA: ICD-10-CM

## 2025-02-12 DIAGNOSIS — M54.50 ACUTE RIGHT-SIDED LOW BACK PAIN WITHOUT SCIATICA: ICD-10-CM

## 2025-02-12 DIAGNOSIS — R19.7 DIARRHEA OF PRESUMED INFECTIOUS ORIGIN: Primary | ICD-10-CM

## 2025-02-12 LAB
ANION GAP SERPL CALC-SCNC: 10 MMOL/L (ref 0–18)
BASOPHILS # BLD AUTO: 0.04 X10(3) UL (ref 0–0.2)
BASOPHILS NFR BLD AUTO: 0.5 %
BUN BLD-MCNC: 7 MG/DL (ref 9–23)
BUN/CREAT SERPL: 10.6 (ref 10–20)
C DIFF TOX B STL QL: NEGATIVE
CALCIUM BLD-MCNC: 9.3 MG/DL (ref 8.7–10.4)
CHLORIDE SERPL-SCNC: 104 MMOL/L (ref 98–112)
CO2 SERPL-SCNC: 24 MMOL/L (ref 21–32)
CREAT BLD-MCNC: 0.66 MG/DL
DEPRECATED RDW RBC AUTO: 37.2 FL (ref 35.1–46.3)
EGFRCR SERPLBLD CKD-EPI 2021: 120 ML/MIN/1.73M2 (ref 60–?)
EOSINOPHIL # BLD AUTO: 0.34 X10(3) UL (ref 0–0.7)
EOSINOPHIL NFR BLD AUTO: 4 %
ERYTHROCYTE [DISTWIDTH] IN BLOOD BY AUTOMATED COUNT: 13 % (ref 11–15)
GLUCOSE BLD-MCNC: 82 MG/DL (ref 70–99)
HCT VFR BLD AUTO: 38.1 %
HEMOCCULT STL QL: NEGATIVE
HGB BLD-MCNC: 13.1 G/DL
IMM GRANULOCYTES # BLD AUTO: 0.03 X10(3) UL (ref 0–1)
IMM GRANULOCYTES NFR BLD: 0.4 %
LYMPHOCYTES # BLD AUTO: 1.92 X10(3) UL (ref 1–4)
LYMPHOCYTES NFR BLD AUTO: 22.6 %
MCH RBC QN AUTO: 26.9 PG (ref 26–34)
MCHC RBC AUTO-ENTMCNC: 34.4 G/DL (ref 31–37)
MCV RBC AUTO: 78.2 FL
MONOCYTES # BLD AUTO: 0.45 X10(3) UL (ref 0.1–1)
MONOCYTES NFR BLD AUTO: 5.3 %
NEUTROPHILS # BLD AUTO: 5.71 X10 (3) UL (ref 1.5–7.7)
NEUTROPHILS # BLD AUTO: 5.71 X10(3) UL (ref 1.5–7.7)
NEUTROPHILS NFR BLD AUTO: 67.2 %
OSMOLALITY SERPL CALC.SUM OF ELEC: 283 MOSM/KG (ref 275–295)
PLATELET # BLD AUTO: 281 10(3)UL (ref 150–450)
POTASSIUM SERPL-SCNC: 3 MMOL/L (ref 3.5–5.1)
RBC # BLD AUTO: 4.87 X10(6)UL
SODIUM SERPL-SCNC: 138 MMOL/L (ref 136–145)
WBC # BLD AUTO: 8.5 X10(3) UL (ref 4–11)

## 2025-02-12 PROCEDURE — 80048 BASIC METABOLIC PNL TOTAL CA: CPT | Performed by: EMERGENCY MEDICINE

## 2025-02-12 PROCEDURE — 87015 SPECIMEN INFECT AGNT CONCNTJ: CPT | Performed by: NURSE PRACTITIONER

## 2025-02-12 PROCEDURE — 36415 COLL VENOUS BLD VENIPUNCTURE: CPT

## 2025-02-12 PROCEDURE — 87493 C DIFF AMPLIFIED PROBE: CPT | Performed by: NURSE PRACTITIONER

## 2025-02-12 PROCEDURE — 99283 EMERGENCY DEPT VISIT LOW MDM: CPT

## 2025-02-12 PROCEDURE — 99284 EMERGENCY DEPT VISIT MOD MDM: CPT

## 2025-02-12 PROCEDURE — 82272 OCCULT BLD FECES 1-3 TESTS: CPT | Performed by: NURSE PRACTITIONER

## 2025-02-12 PROCEDURE — 87427 SHIGA-LIKE TOXIN AG IA: CPT | Performed by: NURSE PRACTITIONER

## 2025-02-12 PROCEDURE — 87045 FECES CULTURE AEROBIC BACT: CPT | Performed by: NURSE PRACTITIONER

## 2025-02-12 PROCEDURE — 87046 STOOL CULTR AEROBIC BACT EA: CPT | Performed by: NURSE PRACTITIONER

## 2025-02-12 PROCEDURE — 85025 COMPLETE CBC W/AUTO DIFF WBC: CPT | Performed by: EMERGENCY MEDICINE

## 2025-02-12 RX ORDER — KETOROLAC TROMETHAMINE 10 MG/1
10 TABLET, FILM COATED ORAL EVERY 6 HOURS PRN
Qty: 20 TABLET | Refills: 0 | Status: SHIPPED | OUTPATIENT
Start: 2025-02-12 | End: 2025-02-24

## 2025-02-12 RX ORDER — LOPERAMIDE HYDROCHLORIDE 2 MG/1
2 TABLET ORAL 4 TIMES DAILY PRN
Qty: 20 TABLET | Refills: 0 | Status: SHIPPED | OUTPATIENT
Start: 2025-02-12 | End: 2025-02-24

## 2025-02-12 RX ORDER — POTASSIUM CHLORIDE 1500 MG/1
40 TABLET, EXTENDED RELEASE ORAL ONCE
Status: COMPLETED | OUTPATIENT
Start: 2025-02-12 | End: 2025-02-12

## 2025-02-12 RX ORDER — DIPHENOXYLATE HYDROCHLORIDE AND ATROPINE SULFATE 2.5; .025 MG/1; MG/1
1 TABLET ORAL 4 TIMES DAILY PRN
Qty: 15 TABLET | Refills: 0 | Status: SHIPPED | OUTPATIENT
Start: 2025-02-12 | End: 2025-02-24

## 2025-02-12 RX ORDER — CYCLOBENZAPRINE HCL 10 MG
10 TABLET ORAL 3 TIMES DAILY PRN
Qty: 20 TABLET | Refills: 0 | Status: SHIPPED | OUTPATIENT
Start: 2025-02-12 | End: 2025-02-24

## 2025-02-13 ENCOUNTER — OFFICE VISIT (OUTPATIENT)
Dept: FAMILY MEDICINE CLINIC | Facility: CLINIC | Age: 30
End: 2025-02-13
Payer: COMMERCIAL

## 2025-02-13 VITALS
WEIGHT: 181 LBS | HEIGHT: 63 IN | TEMPERATURE: 98 F | SYSTOLIC BLOOD PRESSURE: 114 MMHG | HEART RATE: 67 BPM | BODY MASS INDEX: 32.07 KG/M2 | DIASTOLIC BLOOD PRESSURE: 72 MMHG

## 2025-02-13 DIAGNOSIS — R10.84 GENERALIZED ABDOMINAL PAIN: ICD-10-CM

## 2025-02-13 DIAGNOSIS — R19.7 DIARRHEA, UNSPECIFIED TYPE: Primary | ICD-10-CM

## 2025-02-13 DIAGNOSIS — E87.6 HYPOKALEMIA: ICD-10-CM

## 2025-02-13 DIAGNOSIS — Z00.00 ROUTINE MEDICAL EXAM: ICD-10-CM

## 2025-02-13 LAB
APPEARANCE: CLEAR
GLUCOSE (URINE DIPSTICK): NEGATIVE MG/DL
MULTISTIX LOT#: ABNORMAL NUMERIC
NITRITE, URINE: POSITIVE
PH, URINE: 5.5 (ref 4.5–8)
PROTEIN (URINE DIPSTICK): >=300 MG/DL
SPECIFIC GRAVITY: 1.03 (ref 1–1.03)
UROBILINOGEN,SEMI-QN: 1 MG/DL (ref 0–1.9)

## 2025-02-13 PROCEDURE — 81003 URINALYSIS AUTO W/O SCOPE: CPT | Performed by: FAMILY MEDICINE

## 2025-02-13 PROCEDURE — 99214 OFFICE O/P EST MOD 30 MIN: CPT | Performed by: FAMILY MEDICINE

## 2025-02-13 RX ORDER — AZITHROMYCIN 500 MG/1
500 TABLET, FILM COATED ORAL DAILY
Qty: 3 TABLET | Refills: 0 | Status: SHIPPED | OUTPATIENT
Start: 2025-02-13 | End: 2025-02-16

## 2025-02-13 RX ORDER — AZITHROMYCIN 500 MG/1
500 TABLET, FILM COATED ORAL DAILY
Qty: 3 TABLET | Refills: 0 | Status: SHIPPED | OUTPATIENT
Start: 2025-02-13 | End: 2025-02-13

## 2025-02-13 NOTE — PROGRESS NOTES
HPI:   Racheal Clemente is a 29 year old female who presents for:     Patient presents with:  ER F/U: Diarrhea for about 2 weeks          See ROS below for other pertinent positive and negative complaints.    I reviewed her's Past Medical History, Past Surgical History, Family and Social History    Labs:   Lab Results   Component Value Date/Time    WBC 8.5 02/12/2025 03:52 PM    HGB 13.1 02/12/2025 03:52 PM    .0 02/12/2025 03:52 PM      Lab Results   Component Value Date/Time    GLU 82 02/12/2025 03:52 PM     02/12/2025 03:52 PM    K 3.0 (L) 02/12/2025 03:52 PM     02/12/2025 03:52 PM    CO2 24.0 02/12/2025 03:52 PM    CREATSERUM 0.66 02/12/2025 03:52 PM    CA 9.3 02/12/2025 03:52 PM    ALB 4.4 02/09/2024 03:27 PM    TP 7.8 02/09/2024 03:27 PM    ALKPHO 71 02/09/2024 03:27 PM    AST 22 02/09/2024 03:27 PM    ALT 31 02/09/2024 03:27 PM    BILT 0.4 02/09/2024 03:27 PM    TSH 1.680 02/09/2024 03:27 PM        Lab Results   Component Value Date/Time    CHOLEST 163 02/09/2024 03:27 PM    HDL 52 02/09/2024 03:27 PM    TRIG 168 (H) 02/09/2024 03:27 PM    LDL 82 02/09/2024 03:27 PM    NONHDLC 111 02/09/2024 03:27 PM           Current Outpatient Medications   Medication Sig Dispense Refill    azithromycin 500 MG Oral Tab Take 1 tablet (500 mg total) by mouth daily for 3 days. 3 tablet 0    Ketorolac Tromethamine 10 MG Oral Tab Take 1 tablet (10 mg total) by mouth every 6 (six) hours as needed for Pain. 20 tablet 0    diphenoxylate-atropine 2.5-0.025 MG Oral Tab Take 1 tablet by mouth 4 (four) times daily as needed for Diarrhea. 15 tablet 0    Loperamide HCl 2 MG Oral Tab Take 1 tablet (2 mg total) by mouth 4 (four) times daily as needed for Diarrhea. 20 tablet 0    dicyclomine 20 MG Oral Tab Take 1 tablet (20 mg total) by mouth 4 (four) times daily as needed. 30 tablet 0    Tirzepatide-Weight Management (ZEPBOUND) 5 MG/0.5ML Subcutaneous Solution Auto-injector Inject 5 mg into the skin once a week.  Start after completing full 4 weeks on 2.5 mg weekly dose. 2 mL 2    cyclobenzaprine 10 MG Oral Tab Take 1 tablet (10 mg total) by mouth 3 (three) times daily as needed for Muscle spasms. (Patient not taking: Reported on 2/13/2025) 20 tablet 0    Tirzepatide-Weight Management (ZEPBOUND) 2.5 MG/0.5ML Subcutaneous Solution Auto-injector Inject 2.5 mg into the skin once a week. (Patient not taking: Reported on 2/13/2025) 2 mL 0      History reviewed. No pertinent past medical history.   History reviewed. No pertinent surgical history.   Family History   Problem Relation Age of Onset    Other (prediabetes) Mother      Allergies[1]   Social History:  Social History     Socioeconomic History    Marital status: Single   Tobacco Use    Smoking status: Never    Smokeless tobacco: Never   Vaping Use    Vaping status: Never Used   Substance and Sexual Activity    Alcohol use: Yes    Drug use: Never         REVIEW OF SYSTEMS:   Review of Systems    EXAM:   /72   Pulse 67   Temp 98 °F (36.7 °C) (Temporal)   Ht 5' 3\" (1.6 m)   Wt 181 lb (82.1 kg)   LMP 02/13/2025 (Approximate)   BMI 32.06 kg/m²  Body mass index is 32.06 kg/m².  Physical Exam    ASSESSMENT AND PLAN:   1. Racheal Clemente is a 29 year old female who presents for:  Check U/A: Moderate bili, trace ketones, large blood, (patient on her menstrual cycle), elevated proteins, positive nitrite and  trace leukocyte Estrace  Patient Instructions   Will contact you/patient when the test(s):  urine culture and stool study, result(s) are final and with further recommendations then if any changes.     Recommend:    Rest, hydration, bland diet and symptomatic treatment.    Medication(s), azithromycin for 3 days, as prescribed.    Follow-up with your PCP in the next few days as needed, or sooner if no improvement or symptoms are worsening.        Go to the emergency room or call 911 for any new or suddenly worsening symptoms, any signs of acute distress,  respiratory distress or emergent changes.      Follow up: as above (See AVS)    All questions were answered.  We discussed the indications, proper use, risks, and benefits of the above recommendations including any medication(s) as prescribed.  The patient (and/or guardian or parent if less than 18 years old) indicate(s) understanding and agree(s) to the above plan of care.    Orders Placed This Encounter   Procedures    Comp Metabolic Panel (14)    POC Urinalysis, Automated Dip without microscopy (PCA and [facility] ONLY) [32999]    Urine Culture, Routine     Meds & Refills for this Visit:  Requested Prescriptions     Signed Prescriptions Disp Refills    azithromycin 500 MG Oral Tab 3 tablet 0     Sig: Take 1 tablet (500 mg total) by mouth daily for 3 days.     Other Orders, Imaging & Consults:  None    Racheal Palomino MD to look like in the blood protein nitrite positive leuk trase interesting let me because she could have a UTI however so the antibiotic and give her does not work with more closely azithromycin traveler's diarrhea but she is not complaining of anything in a lot of stuff monitoring document that is are not for culture yeah I got the culture first okay here once again yeah was hoping this would help me but I did not realize everything was go to be abnormal       [1]   Allergies  Allergen Reactions    Cat Hair Extract Coughing, ITCHING, RASH and SHORTNESS OF BREATH    Dander UNKNOWN     dogs    Mushrooms RASH     Around mouth

## 2025-02-13 NOTE — ED PROVIDER NOTES
Patient Seen in: [facility] Emergency Department    History     Chief Complaint   Patient presents with    Diarrhea    Flank Pain       HPI    Patient presents to the ED complaining of diarrhea for the past 1.5 weeks.  She states diarrhea is fairly frequent.  Denies any blood in her stool denies any fevers or chills.  Has had intermittent abdominal cramps.  Also complains of right lower back pain that started today.  She states pain is sharp and worse with certain movements or positions.  No radiation of pain and no numbness or weakness to her legs.    History reviewed. History reviewed. No pertinent past medical history.    History reviewed. History reviewed. No pertinent surgical history.      Medications :  Prescriptions Prior to Admission[1]     Family History   Problem Relation Age of Onset    Other (prediabetes) Mother        Smoking Status:   Social History     Socioeconomic History    Marital status: Single   Tobacco Use    Smoking status: Never    Smokeless tobacco: Never   Vaping Use    Vaping status: Never Used   Substance and Sexual Activity    Alcohol use: Yes    Drug use: Never       Constitutional and vital signs reviewed.      Social History and Family History elements reviewed from today, pertinent positives to the presenting problem noted.    Physical Exam     ED Triage Vitals [02/12/25 1432]   /86   Pulse 72   Resp 16   Temp 98 °F (36.7 °C)   Temp src Oral   SpO2 99 %   O2 Device None (Room air)       All measures to prevent infection transmission during my interaction with the patient were taken. Handwashing was performed prior to and after the exam.  Stethoscope and any equipment used during my examination was cleaned with super sani-cloth germicidal wipes following the exam.     Physical Exam  Vitals and nursing note reviewed.   Constitutional:       General: She is not in acute distress.     Appearance: She is well-developed. She is not ill-appearing or toxic-appearing.   HENT:       Head: Normocephalic and atraumatic.   Eyes:      General:         Right eye: No discharge.         Left eye: No discharge.      Conjunctiva/sclera: Conjunctivae normal.   Neck:      Trachea: No tracheal deviation.   Cardiovascular:      Rate and Rhythm: Normal rate and regular rhythm.   Pulmonary:      Effort: Pulmonary effort is normal. No respiratory distress.      Breath sounds: No stridor.   Abdominal:      General: There is no distension.      Palpations: Abdomen is soft.      Tenderness: There is no abdominal tenderness.   Musculoskeletal:         General: Tenderness present. No deformity.      Comments: Tenderness to the right lower lumbar paraspinal musculature.  No spinal tenderness or deformity.   Skin:     General: Skin is warm and dry.   Neurological:      Mental Status: She is alert and oriented to person, place, and time.   Psychiatric:         Mood and Affect: Mood normal.         Behavior: Behavior normal.         ED Course        Labs Reviewed   BASIC METABOLIC PANEL (8) - Abnormal; Notable for the following components:       Result Value    Potassium 3.0 (*)     BUN 7 (*)     All other components within normal limits   CBC WITH DIFFERENTIAL WITH PLATELET - Abnormal; Notable for the following components:    MCV 78.2 (*)     All other components within normal limits       As Interpreted by me    Imaging Results Available and Reviewed while in ED: No results found.  ED Medications Administered:   Medications   potassium chloride (Klor-Con M20) tab 40 mEq (40 mEq Oral Given 2/12/25 1638)         MDM     Vitals:    02/12/25 1432   BP: 146/86   Pulse: 72   Resp: 16   Temp: 98 °F (36.7 °C)   TempSrc: Oral   SpO2: 99%   Weight: 83 kg   Height: 160 cm (5' 3\")     *I personally reviewed and interpreted all ED vitals.    Pulse Ox: 99%, Room air, Normal       Differential Diagnosis/ Diagnostic Considerations: Low back pain, infectious diarrhea, dehydration, CATRACHO, other    Complicating Factors: The patient  already has does not have any pertinent problems on file. to contribute to the complexity of this ED evaluation.    Medical Decision Making  Patient presents to the ED with a week and a half of diarrhea.  Likely infectious etiology.  Possibly viral.  Abdomen benign exam, low concern for significant abdominal pathology.  Laboratory testing with mild hypokalemia.  Replaced in the ED.  No CATRACHO.  Patient stable for discharge with antidiarrheal medication.  She is taken none thus far.  Low back pain likely muscle skeletal in etiology.  No concern for cauda equina precautions.  Will discharge with pain medication and recommend outpatient follow-up.    Problems Addressed:  Acute right-sided low back pain without sciatica: acute illness or injury  Diarrhea of presumed infectious origin: acute illness or injury  Hypokalemia: acute illness or injury    Amount and/or Complexity of Data Reviewed  Labs: ordered. Decision-making details documented in ED Course.    Risk  Prescription drug management.        Condition upon leaving the department: Stable    Disposition and Plan     Clinical Impression:  1. Diarrhea of presumed infectious origin    2. Hypokalemia    3. Acute right-sided low back pain without sciatica        Disposition:  Discharge    Follow-up:  [facility] Emergency Department  [address] [facility] [address]  [phone]  Follow up  If symptoms worsen      Medications Prescribed:  Discharge Medication List as of 2/12/2025  4:42 PM        START taking these medications    Details   Ketorolac Tromethamine 10 MG Oral Tab Take 1 tablet (10 mg total) by mouth every 6 (six) hours as needed for Pain., Normal, Disp-20 tablet, R-0      cyclobenzaprine 10 MG Oral Tab Take 1 tablet (10 mg total) by mouth 3 (three) times daily as needed for Muscle spasms., Normal, Disp-20 tablet, R-0      diphenoxylate-atropine 2.5-0.025 MG Oral Tab Take 1 tablet by mouth 4 (four) times daily as needed for Diarrhea., Normal,  Disp-15 tablet, R-0      Loperamide HCl 2 MG Oral Tab Take 1 tablet (2 mg total) by mouth 4 (four) times daily as needed for Diarrhea., Normal, Disp-20 tablet, R-0                              [1] (Not in a hospital admission)

## 2025-02-13 NOTE — PROGRESS NOTES
HPI:   Racheal Clemente is a 29 year old female who presents for:     Patient presents with:  ER F/U: Diarrhea for about  1 and 1/2 weeks, started the evening after having food brought to her office by a left, she states her provider also had diarrhea but only for a few days, patient states that she has watery diarrhea about 5 times a day cramping and some bloating and burping, no nausea or vomiting, no fever, no chills, no myalgias, no recent travel she went to the ER yesterday for worsening abdominal pain, epigastric and bilateral lower quadrant, she was given a medicine for cramping which is helping, she did submit a stool sample which is still pending except for the C. difficile which was negative, no urine tests or imaging test was done,          See ROS below for other pertinent positive and negative complaints.    I reviewed her's Past Medical History, Past Surgical History, Family and Social History    Labs:   Lab Results   Component Value Date/Time    WBC 8.5 02/12/2025 03:52 PM    HGB 13.1 02/12/2025 03:52 PM    .0 02/12/2025 03:52 PM      Lab Results   Component Value Date/Time    GLU 82 02/12/2025 03:52 PM     02/12/2025 03:52 PM    K 3.0 (L) 02/12/2025 03:52 PM     02/12/2025 03:52 PM    CO2 24.0 02/12/2025 03:52 PM    CREATSERUM 0.66 02/12/2025 03:52 PM    CA 9.3 02/12/2025 03:52 PM    ALB 4.4 02/09/2024 03:27 PM    TP 7.8 02/09/2024 03:27 PM    ALKPHO 71 02/09/2024 03:27 PM    AST 22 02/09/2024 03:27 PM    ALT 31 02/09/2024 03:27 PM    BILT 0.4 02/09/2024 03:27 PM    TSH 1.680 02/09/2024 03:27 PM        Lab Results   Component Value Date/Time    CHOLEST 163 02/09/2024 03:27 PM    HDL 52 02/09/2024 03:27 PM    TRIG 168 (H) 02/09/2024 03:27 PM    LDL 82 02/09/2024 03:27 PM    NONHDLC 111 02/09/2024 03:27 PM           Current Outpatient Medications   Medication Sig Dispense Refill    azithromycin 500 MG Oral Tab Take 1 tablet (500 mg total) by mouth daily for 3 days. 3 tablet 0     Ketorolac Tromethamine 10 MG Oral Tab Take 1 tablet (10 mg total) by mouth every 6 (six) hours as needed for Pain. 20 tablet 0    diphenoxylate-atropine 2.5-0.025 MG Oral Tab Take 1 tablet by mouth 4 (four) times daily as needed for Diarrhea. 15 tablet 0    Loperamide HCl 2 MG Oral Tab Take 1 tablet (2 mg total) by mouth 4 (four) times daily as needed for Diarrhea. 20 tablet 0    dicyclomine 20 MG Oral Tab Take 1 tablet (20 mg total) by mouth 4 (four) times daily as needed. 30 tablet 0    Tirzepatide-Weight Management (ZEPBOUND) 5 MG/0.5ML Subcutaneous Solution Auto-injector Inject 5 mg into the skin once a week. Start after completing full 4 weeks on 2.5 mg weekly dose. 2 mL 2    cyclobenzaprine 10 MG Oral Tab Take 1 tablet (10 mg total) by mouth 3 (three) times daily as needed for Muscle spasms. (Patient not taking: Reported on 2/13/2025) 20 tablet 0    Tirzepatide-Weight Management (ZEPBOUND) 2.5 MG/0.5ML Subcutaneous Solution Auto-injector Inject 2.5 mg into the skin once a week. (Patient not taking: Reported on 2/13/2025) 2 mL 0      History reviewed. No pertinent past medical history.   History reviewed. No pertinent surgical history.   Family History   Problem Relation Age of Onset    Other (prediabetes) Mother      Allergies[1]   Social History:  Social History     Socioeconomic History    Marital status: Single   Tobacco Use    Smoking status: Never    Smokeless tobacco: Never   Vaping Use    Vaping status: Never Used   Substance and Sexual Activity    Alcohol use: Yes    Drug use: Never         REVIEW OF SYSTEMS:   Review of Systems   Constitutional: Negative.  Negative for chills, fatigue and fever.   Respiratory: Negative.     Cardiovascular: Negative.    Gastrointestinal:  Positive for abdominal distention, abdominal pain and diarrhea. Negative for anal bleeding, blood in stool, constipation, nausea, rectal pain and vomiting.   Genitourinary: Negative.  Negative for dysuria, flank pain, frequency and  urgency.   Musculoskeletal: Negative.  Negative for back pain and myalgias.   Skin: Negative.  Negative for rash.   Neurological: Negative.        EXAM:   /72   Pulse 67   Temp 98 °F (36.7 °C) (Temporal)   Ht 5' 3\" (1.6 m)   Wt 181 lb (82.1 kg)   LMP 02/13/2025 (Approximate)   BMI 32.06 kg/m²  Body mass index is 32.06 kg/m².  Physical Exam  Vitals and nursing note reviewed. Exam conducted with a chaperone present.   Constitutional:       General: She is not in acute distress.     Appearance: Normal appearance. She is normal weight. She is not ill-appearing, toxic-appearing or diaphoretic.   HENT:      Head: Normocephalic and atraumatic.      Nose: Nose normal.   Eyes:      Extraocular Movements: Extraocular movements intact.      Conjunctiva/sclera: Conjunctivae normal.   Pulmonary:      Effort: Pulmonary effort is normal.   Abdominal:      General: Abdomen is flat. Bowel sounds are normal. There is no distension.      Palpations: Abdomen is soft. There is no mass.      Tenderness: There is abdominal tenderness. There is no right CVA tenderness, left CVA tenderness, guarding or rebound.      Comments: Mild epigastric   Musculoskeletal:         General: Normal range of motion.      Cervical back: Neck supple. No rigidity.   Skin:     General: Skin is warm.      Findings: No erythema or rash.   Neurological:      General: No focal deficit present.      Mental Status: She is alert and oriented to person, place, and time.   Psychiatric:         Mood and Affect: Mood normal.         Thought Content: Thought content normal.         Judgment: Judgment normal.         ASSESSMENT AND PLAN:   1. Racheal Clemente is a 29 year old female who presents for:    1. Diarrhea, unspecified type, no acute or GI distress, persistent for over 1 week, etiology unclear, infectious/food poisoning versus other?  C. difficile negative, remainder of stool studies still pending  Will check urinalysis: positive for blood (has  menstrual cycle now), bili, ketones, nitrates, leukocyte esterase  Check urine [id number] results  Check [id number] pending results  Will empirically treat for bacterial colitis-azithromycin 500 mg for 3 days  Follow-up pending stool studies  Recommend additional evaluation and treatment and GI consultation if no improvement in the next few days  See additional recommendations and follow-up below    - Comp Metabolic Panel (14)    2. Generalized abdominal pain  Associated with the above, see above  - POC Urinalysis, Automated Dip without microscopy (PCA and EMMG ONLY) [95520]  - Urine Culture, Routine; Future  - Urine Culture, Routine    3. Hypokalemia  Recheck in the next few days, follow-up pending results and as below  - Comp Metabolic Panel (14)    4. Routine medical exam  - Comp Metabolic Panel (14)    Patient Instructions   Will contact you/patient when the test(s): labs (CMP-check next week), urine culture and stool study, result(s) are final and with further recommendations then if any changes.     Recommend:    Rest, hydration, bland diet and symptomatic treatment.    Medication(s), azithromycin for 3 days, as prescribed.    Follow-up with your PCP in the next few days as needed, or sooner if no improvement or symptoms are worsening.        Go to the emergency room or call 911 for any new or suddenly worsening symptoms, any signs of acute distress, respiratory distress or emergent changes.      Follow up: as above (See AVS)    All questions were answered.  We discussed the indications, proper use, risks, and benefits of the above recommendations including any medication(s) as prescribed.  The patient (and/or guardian or parent if less than 18 years old) indicate(s) understanding and agree(s) to the above plan of care.    Orders Placed This Encounter   Procedures    Comp Metabolic Panel (14)    POC Urinalysis, Automated Dip without microscopy (PCA and EMMG ONLY) [83165]    Urine Culture, Routine      Meds & Refills for this Visit:  Requested Prescriptions     Signed Prescriptions Disp Refills    azithromycin 500 MG Oral Tab 3 tablet 0     Sig: Take 1 tablet (500 mg total) by mouth daily for 3 days.     Other Orders, Imaging & Consults:  None    Racheal Palomino MD       [1]   Allergies  Allergen Reactions    Cat Hair Extract Coughing, ITCHING, RASH and SHORTNESS OF BREATH    Dander UNKNOWN     dogs    Mushrooms RASH     Around mouth

## 2025-02-13 NOTE — PATIENT INSTRUCTIONS
Will contact you/patient when the test(s): labs (CMP-check next week), urine culture and stool study, result(s) are final and with further recommendations then if any changes.     Recommend:    Rest, hydration, bland diet and symptomatic treatment.    Medication(s), azithromycin for 3 days, as prescribed.    Follow-up with your PCP in the next few days as needed, or sooner if no improvement or symptoms are worsening.        Go to the emergency room or call 911 for any new or suddenly worsening symptoms, any signs of acute distress, respiratory distress or emergent changes.

## 2025-02-18 ENCOUNTER — OFFICE VISIT (OUTPATIENT)
Dept: GASTROENTEROLOGY | Facility: CLINIC | Age: 30
End: 2025-02-18
Payer: COMMERCIAL

## 2025-02-18 ENCOUNTER — LAB ENCOUNTER (OUTPATIENT)
Dept: LAB | Age: 30
End: 2025-02-18
Attending: FAMILY MEDICINE
Payer: COMMERCIAL

## 2025-02-18 VITALS
WEIGHT: 191.38 LBS | HEART RATE: 62 BPM | SYSTOLIC BLOOD PRESSURE: 117 MMHG | HEIGHT: 63 IN | BODY MASS INDEX: 33.91 KG/M2 | DIASTOLIC BLOOD PRESSURE: 67 MMHG

## 2025-02-18 DIAGNOSIS — R10.13 EPIGASTRIC ABDOMINAL PAIN: ICD-10-CM

## 2025-02-18 DIAGNOSIS — R10.32 LLQ ABDOMINAL PAIN: ICD-10-CM

## 2025-02-18 DIAGNOSIS — R10.12 LUQ ABDOMINAL PAIN: ICD-10-CM

## 2025-02-18 DIAGNOSIS — R19.7 DIARRHEA, UNSPECIFIED TYPE: ICD-10-CM

## 2025-02-18 DIAGNOSIS — M54.50 ACUTE BILATERAL LOW BACK PAIN WITHOUT SCIATICA: ICD-10-CM

## 2025-02-18 DIAGNOSIS — R19.4 CHANGE IN BOWEL HABITS: Primary | ICD-10-CM

## 2025-02-18 LAB
ALBUMIN SERPL-MCNC: 4.6 G/DL (ref 3.2–4.8)
ALBUMIN/GLOB SERPL: 1.6 {RATIO} (ref 1–2)
ALP LIVER SERPL-CCNC: 55 U/L
ALT SERPL-CCNC: 25 U/L
ANION GAP SERPL CALC-SCNC: 10 MMOL/L (ref 0–18)
AST SERPL-CCNC: 18 U/L (ref ?–34)
BILIRUB SERPL-MCNC: 0.5 MG/DL (ref 0.3–1.2)
BUN BLD-MCNC: 10 MG/DL (ref 9–23)
BUN/CREAT SERPL: 14.3 (ref 10–20)
CALCIUM BLD-MCNC: 9.1 MG/DL (ref 8.7–10.4)
CHLORIDE SERPL-SCNC: 105 MMOL/L (ref 98–112)
CO2 SERPL-SCNC: 27 MMOL/L (ref 21–32)
CREAT BLD-MCNC: 0.7 MG/DL
EGFRCR SERPLBLD CKD-EPI 2021: 119 ML/MIN/1.73M2 (ref 60–?)
FASTING STATUS PATIENT QL REPORTED: NO
GLOBULIN PLAS-MCNC: 2.8 G/DL (ref 2–3.5)
GLUCOSE BLD-MCNC: 85 MG/DL (ref 70–99)
OSMOLALITY SERPL CALC.SUM OF ELEC: 292 MOSM/KG (ref 275–295)
POTASSIUM SERPL-SCNC: 3.8 MMOL/L (ref 3.5–5.1)
PROT SERPL-MCNC: 7.4 G/DL (ref 5.7–8.2)
SODIUM SERPL-SCNC: 142 MMOL/L (ref 136–145)

## 2025-02-18 PROCEDURE — 36415 COLL VENOUS BLD VENIPUNCTURE: CPT | Performed by: FAMILY MEDICINE

## 2025-02-18 PROCEDURE — 80053 COMPREHEN METABOLIC PANEL: CPT | Performed by: FAMILY MEDICINE

## 2025-02-18 PROCEDURE — 99204 OFFICE O/P NEW MOD 45 MIN: CPT | Performed by: INTERNAL MEDICINE

## 2025-02-18 NOTE — PROGRESS NOTES
** Scroll down for HPI. **    ASSESSMENT/PLAN:     29 year old strong and healthy young woman who recently started Zepbound medication for weight loss.  In just 6 weeks, she has lost over 20 LBS so far.  Remarkable news.    Racheal presents today for follow-up after ED evaluation 2/12/2025 and PCP follow-up for acute syndrome of diarrhea and abdominal pain.    Today, she describes onset about 2 weeks ago of acute nonbloody diarrhea and epigastric upper, left-sided abdominal pain.      Suggest:    Acute illness with approximately 10-day history diarrhea and associated epigastric and left-sided abdominal pain  Infectious illness remains most likely possibility, norovirus less likely bacterial pathogen  Nonbloody diarrhea tested negative for occult blood on ED assays 2/12/2025  UA tested positive for blood, nitrate, some leukocyte esterase but culture was negative.    No imaging has been performed to date  Prescribed 3-day course of azithromycin taken approximately February 13-16 which seems to have helped.  Diarrhea may be resolving.    Repeat labs after today's visit 2/18/2025 reassuring with normal renal function; AST 18 ALT 25 alk phosphatase 55; serum albumin 4.6    Duration of illness and the significant abdominal pain have Racheal very concerned.  We discussed further workup could include labs specifically LFTs and repeat CBC; imaging studies.  Could consider ultrasound of gallbladder to assess for cholelithiasis.  This would be more for family history of cholelithiasis and cholecystectomy in her mother and her current symptoms.  To fully evaluate the ongoing pain in these events we discussed and I ordered a CT scan abdomen and pelvis.  This would be to evaluate the colon, left kidney and ovary, other abdominal structures for causes of this persistent pain.  CT ordered today from [facility] imaging, await word from insurance.    Further evaluation should diarrhea persist or worsen could include colonoscopy  examination    CMP lab ordered today, reassuring as above.    Acute abdominal symptoms, family history of cholelithiasis in a parent     Reassuring CMP today  Could consider liver and gallbladder ultrasound in the future    Elevated BMI, weight loss therapy     Racheal has been doing very well on Zepbound tirzepatide medication, reports 20 LBS weight loss in 6 weeks.  Perfect LFTs today as above  Zepbound tirzepatide on hold since the symptoms began  Likely resume soon    Colon cancer screening, average risk     Never smoker  Reassuring family history  Preventive screening colonoscopy examinations at age 45 or as per clinical course            Office visit 2/18/2025:  HPI:    Patient ID: Racheal Clemente is a 29 year old strong and healthy young woman who recently started Zepbound medication for weight loss.  In just 6 weeks, she has lost over 20 LBS so far.  Remarkable news.    Racheal presents today for follow-up after ED evaluation 2/12/2025 and PCP follow-up for acute syndrome of diarrhea and abdominal pain.    Today, she describes onset about 2 weeks ago of acute nonbloody diarrhea and epigastric upper, left-sided abdominal pain.    There was a question of food poisoning.  Nonbloody diarrhea lasted about 10 days, seems to have resolved after brief course of azithromycin taken just late last week as below.    Racheal describes an associated epigastric and diffuse left-sided LUQ and LLQ abdominal pain.  This seems to be waxing and waning but much better today.    Racheal saw Dr Palomino the following day 2/13/2025.  Dr. Palomino prescribed her a 3-day course of azithromycin for infectious enteritis which she had completed by Saturday or Sunday.    Dr. Palomino notes:    \"ER F/U: Diarrhea for about  1 and 1/2 weeks, started the evening after having food brought to her office by a left, she states her provider also had diarrhea but only for a few days, patient states that she has watery  diarrhea about 5 times a day cramping and some bloating and burping, no nausea or vomiting, no fever, no chills, no myalgias, no recent travel she went to the ER yesterday for worsening abdominal pain, epigastric and bilateral lower quadrant, she was given a medicine for cramping which is helping, she did submit a stool sample which is still pending except for the C. difficile which was negative, no urine tests or imaging test was done,\"    The diarrhea seemed to resolve, then 3 more episodes of diarrhea with intense cramping Sunday night 2/16/2025, then nothing since as of 2/18/2025 visit.    The abdominal pain has persisted but sounds better today.    During ED evaluation, routine labs were sent but no imaging.    Diarrhea was nonbloody.  Stool samples tested negative for occult blood and routine stool cultures.  Negative C. difficile assay.  BMP was reassuring.    Dr. Palomino ordered a urine culture which has come back negative, very helpful in light of the flank and back pain.  She also ordered a CMP which was not drawn.  We have no recent LFTs.    Wt Readings from Last 20 Encounters:   02/18/25 191 lb 6.4 oz (86.8 kg)   02/13/25 181 lb (82.1 kg)   02/12/25 183 lb (83 kg)   02/11/25 193 lb (87.5 kg)   09/03/24 215 lb 11.2 oz (97.8 kg)   08/01/24 210 lb (95.3 kg)   05/07/24 213 lb (96.6 kg)   04/04/24 211 lb (95.7 kg)   03/21/24 211 lb (95.7 kg)   02/09/24 211 lb (95.7 kg)         Previous EGD examination: n  Previous colonoscopy(ies): n    HPI    Review of Systems         Current Outpatient Medications   Medication Sig Dispense Refill    cyclobenzaprine 10 MG Oral Tab Take 1 tablet (10 mg total) by mouth 3 (three) times daily as needed for Muscle spasms. (Patient not taking: Reported on 2/13/2025) 20 tablet 0    diphenoxylate-atropine 2.5-0.025 MG Oral Tab Take 1 tablet by mouth 4 (four) times daily as needed for Diarrhea. 15 tablet 0    Loperamide HCl 2 MG Oral Tab Take 1 tablet (2 mg total) by mouth 4  (four) times daily as needed for Diarrhea. 20 tablet 0    dicyclomine 20 MG Oral Tab Take 1 tablet (20 mg total) by mouth 4 (four) times daily as needed. 30 tablet 0    Tirzepatide-Weight Management (ZEPBOUND) 5 MG/0.5ML Subcutaneous Solution Auto-injector Inject 5 mg into the skin once a week. Start after completing full 4 weeks on 2.5 mg weekly dose. 2 mL 2    Tirzepatide-Weight Management (ZEPBOUND) 2.5 MG/0.5ML Subcutaneous Solution Auto-injector Inject 2.5 mg into the skin once a week. (Patient not taking: Reported on 2/13/2025) 2 mL 0         Allergies:Allergies[1]  Imaging: No results found.       PHYSICAL EXAM:   Physical Exam                   Over 45 minutes spent today discussing the above and counseling and educating this patient, reviewing chart notes and data and documenting clinical information in the EHR, independently interpreting results and communicating results to the patient/family and composing this comprehensive note, ordering medications or testing, referring and communicating with other healthcare providers, and care coordination with the patient's other providers.      Digital transcription software was utilized to produce this note. The note was proofread for content only. Typographical errors may remain.       Meds This Visit:  Requested Prescriptions      No prescriptions requested or ordered in this encounter       Imaging & Referrals:  None       ID#[id number]         [1]   Allergies  Allergen Reactions    Cat Hair Extract Coughing, ITCHING, RASH and SHORTNESS OF BREATH    Dander UNKNOWN     dogs    Mushrooms RASH     Around mouth

## 2025-02-20 ENCOUNTER — TELEPHONE (OUTPATIENT)
Facility: CLINIC | Age: 30
End: 2025-02-20

## 2025-02-20 ENCOUNTER — PATIENT MESSAGE (OUTPATIENT)
Dept: GASTROENTEROLOGY | Facility: CLINIC | Age: 30
End: 2025-02-20

## 2025-02-20 DIAGNOSIS — R10.13 EPIGASTRIC ABDOMINAL PAIN: ICD-10-CM

## 2025-02-20 DIAGNOSIS — R10.32 LLQ ABDOMINAL PAIN: ICD-10-CM

## 2025-02-20 DIAGNOSIS — R19.7 DIARRHEA, UNSPECIFIED TYPE: ICD-10-CM

## 2025-02-20 DIAGNOSIS — R10.12 LUQ ABDOMINAL PAIN: Primary | ICD-10-CM

## 2025-02-20 DIAGNOSIS — R19.4 CHANGE IN BOWEL HABITS: ICD-10-CM

## 2025-02-20 NOTE — TELEPHONE ENCOUNTER
Caitie from [facility] called    They need a new order for the CT scan  For insurance purposes    Can you please sign if off. I pended a new order

## 2025-02-21 NOTE — TELEPHONE ENCOUNTER
[facility]- could you please advise if patients CT has been denied by insurance? Thank you.

## 2025-02-24 ENCOUNTER — APPOINTMENT (OUTPATIENT)
Dept: ULTRASOUND IMAGING | Facility: HOSPITAL | Age: 30
End: 2025-02-24
Attending: NURSE PRACTITIONER
Payer: COMMERCIAL

## 2025-02-24 ENCOUNTER — HOSPITAL ENCOUNTER (OUTPATIENT)
Facility: HOSPITAL | Age: 30
Setting detail: OBSERVATION
Discharge: HOME | End: 2025-02-26
Attending: INTERNAL MEDICINE | Admitting: INTERNAL MEDICINE
Payer: COMMERCIAL

## 2025-02-24 ENCOUNTER — TELEPHONE (OUTPATIENT)
Facility: CLINIC | Age: 30
End: 2025-02-24

## 2025-02-24 ENCOUNTER — HOSPITAL ENCOUNTER (OUTPATIENT)
Age: 30
Discharge: ANOTHER HOSPITAL | End: 2025-02-24
Payer: COMMERCIAL

## 2025-02-24 VITALS
OXYGEN SATURATION: 97 % | DIASTOLIC BLOOD PRESSURE: 65 MMHG | SYSTOLIC BLOOD PRESSURE: 153 MMHG | RESPIRATION RATE: 16 BRPM | HEART RATE: 66 BPM | TEMPERATURE: 98 F

## 2025-02-24 DIAGNOSIS — K80.50 BILIARY COLIC: Primary | ICD-10-CM

## 2025-02-24 DIAGNOSIS — R10.10 UPPER ABDOMINAL PAIN: Primary | ICD-10-CM

## 2025-02-24 LAB
ALBUMIN SERPL-MCNC: 4.7 G/DL (ref 3.2–4.8)
ALBUMIN/GLOB SERPL: 1.6 {RATIO} (ref 1–2)
ALP LIVER SERPL-CCNC: 64 U/L
ALT SERPL-CCNC: 25 U/L
ANION GAP SERPL CALC-SCNC: 11 MMOL/L (ref 0–18)
AST SERPL-CCNC: 25 U/L (ref ?–34)
B-HCG UR QL: NEGATIVE
BASOPHILS # BLD AUTO: 0.05 X10(3) UL (ref 0–0.2)
BASOPHILS NFR BLD AUTO: 0.6 %
BILIRUB SERPL-MCNC: 0.5 MG/DL (ref 0.3–1.2)
BUN BLD-MCNC: 10 MG/DL (ref 9–23)
BUN/CREAT SERPL: 14.5 (ref 10–20)
CALCIUM BLD-MCNC: 9.2 MG/DL (ref 8.7–10.4)
CHLORIDE SERPL-SCNC: 102 MMOL/L (ref 98–112)
CO2 SERPL-SCNC: 24 MMOL/L (ref 21–32)
CREAT BLD-MCNC: 0.69 MG/DL
DEPRECATED RDW RBC AUTO: 36.8 FL (ref 35.1–46.3)
EGFRCR SERPLBLD CKD-EPI 2021: 119 ML/MIN/1.73M2 (ref 60–?)
EOSINOPHIL # BLD AUTO: 0.26 X10(3) UL (ref 0–0.7)
EOSINOPHIL NFR BLD AUTO: 3.2 %
ERYTHROCYTE [DISTWIDTH] IN BLOOD BY AUTOMATED COUNT: 13.1 % (ref 11–15)
GLOBULIN PLAS-MCNC: 3 G/DL (ref 2–3.5)
GLUCOSE BLD-MCNC: 93 MG/DL (ref 70–99)
HCT VFR BLD AUTO: 38.9 %
HGB BLD-MCNC: 13.4 G/DL
IMM GRANULOCYTES # BLD AUTO: 0.02 X10(3) UL (ref 0–1)
IMM GRANULOCYTES NFR BLD: 0.2 %
LIPASE SERPL-CCNC: 47 U/L (ref 12–53)
LYMPHOCYTES # BLD AUTO: 3 X10(3) UL (ref 1–4)
LYMPHOCYTES NFR BLD AUTO: 36.9 %
MCH RBC QN AUTO: 26.7 PG (ref 26–34)
MCHC RBC AUTO-ENTMCNC: 34.4 G/DL (ref 31–37)
MCV RBC AUTO: 77.6 FL
MONOCYTES # BLD AUTO: 0.32 X10(3) UL (ref 0.1–1)
MONOCYTES NFR BLD AUTO: 3.9 %
NEUTROPHILS # BLD AUTO: 4.49 X10 (3) UL (ref 1.5–7.7)
NEUTROPHILS # BLD AUTO: 4.49 X10(3) UL (ref 1.5–7.7)
NEUTROPHILS NFR BLD AUTO: 55.2 %
OSMOLALITY SERPL CALC.SUM OF ELEC: 283 MOSM/KG (ref 275–295)
PLATELET # BLD AUTO: 287 10(3)UL (ref 150–450)
POTASSIUM SERPL-SCNC: 3.5 MMOL/L (ref 3.5–5.1)
PROT SERPL-MCNC: 7.7 G/DL (ref 5.7–8.2)
RBC # BLD AUTO: 5.01 X10(6)UL
SODIUM SERPL-SCNC: 137 MMOL/L (ref 136–145)
WBC # BLD AUTO: 8.1 X10(3) UL (ref 4–11)

## 2025-02-24 PROCEDURE — 76705 ECHO EXAM OF ABDOMEN: CPT | Performed by: NURSE PRACTITIONER

## 2025-02-24 PROCEDURE — 99213 OFFICE O/P EST LOW 20 MIN: CPT

## 2025-02-24 PROCEDURE — 99223 1ST HOSP IP/OBS HIGH 75: CPT | Performed by: HOSPITALIST

## 2025-02-24 RX ORDER — DEXTROSE MONOHYDRATE, SODIUM CHLORIDE, AND POTASSIUM CHLORIDE 50; 1.49; 9 G/1000ML; G/1000ML; G/1000ML
INJECTION, SOLUTION INTRAVENOUS CONTINUOUS
Status: DISCONTINUED | OUTPATIENT
Start: 2025-02-24 | End: 2025-02-25

## 2025-02-24 RX ORDER — MORPHINE SULFATE 2 MG/ML
1 INJECTION, SOLUTION INTRAMUSCULAR; INTRAVENOUS EVERY 2 HOUR PRN
Status: DISCONTINUED | OUTPATIENT
Start: 2025-02-24 | End: 2025-02-25

## 2025-02-24 RX ORDER — PROCHLORPERAZINE EDISYLATE 5 MG/ML
5 INJECTION INTRAMUSCULAR; INTRAVENOUS EVERY 8 HOURS PRN
Status: DISCONTINUED | OUTPATIENT
Start: 2025-02-24 | End: 2025-02-25

## 2025-02-24 RX ORDER — POLYETHYLENE GLYCOL 3350 17 G/17G
17 POWDER, FOR SOLUTION ORAL DAILY PRN
Status: DISCONTINUED | OUTPATIENT
Start: 2025-02-24 | End: 2025-02-25

## 2025-02-24 RX ORDER — BISACODYL 10 MG
10 SUPPOSITORY, RECTAL RECTAL
Status: DISCONTINUED | OUTPATIENT
Start: 2025-02-24 | End: 2025-02-25

## 2025-02-24 RX ORDER — BENZONATATE 100 MG/1
200 CAPSULE ORAL 3 TIMES DAILY PRN
Status: DISCONTINUED | OUTPATIENT
Start: 2025-02-24 | End: 2025-02-26

## 2025-02-24 RX ORDER — SODIUM CHLORIDE 9 MG/ML
INJECTION, SOLUTION INTRAVENOUS CONTINUOUS
Status: ACTIVE | OUTPATIENT
Start: 2025-02-24 | End: 2025-02-24

## 2025-02-24 RX ORDER — SENNOSIDES 8.6 MG
17.2 TABLET ORAL NIGHTLY PRN
Status: DISCONTINUED | OUTPATIENT
Start: 2025-02-24 | End: 2025-02-25

## 2025-02-24 RX ORDER — METRONIDAZOLE 500 MG/100ML
500 INJECTION, SOLUTION INTRAVENOUS ONCE
Status: COMPLETED | OUTPATIENT
Start: 2025-02-24 | End: 2025-02-24

## 2025-02-24 RX ORDER — MORPHINE SULFATE 4 MG/ML
4 INJECTION, SOLUTION INTRAMUSCULAR; INTRAVENOUS EVERY 2 HOUR PRN
Status: DISCONTINUED | OUTPATIENT
Start: 2025-02-24 | End: 2025-02-25

## 2025-02-24 RX ORDER — METRONIDAZOLE 500 MG/100ML
500 INJECTION, SOLUTION INTRAVENOUS EVERY 12 HOURS
Status: DISCONTINUED | OUTPATIENT
Start: 2025-02-25 | End: 2025-02-26

## 2025-02-24 RX ORDER — MORPHINE SULFATE 2 MG/ML
2 INJECTION, SOLUTION INTRAMUSCULAR; INTRAVENOUS EVERY 2 HOUR PRN
Status: DISCONTINUED | OUTPATIENT
Start: 2025-02-24 | End: 2025-02-25

## 2025-02-24 RX ORDER — SODIUM PHOSPHATE, DIBASIC AND SODIUM PHOSPHATE, MONOBASIC 7; 19 G/230ML; G/230ML
1 ENEMA RECTAL ONCE AS NEEDED
Status: DISCONTINUED | OUTPATIENT
Start: 2025-02-24 | End: 2025-02-25

## 2025-02-24 RX ORDER — ALPRAZOLAM 0.5 MG
0.25 TABLET ORAL ONCE
Status: COMPLETED | OUTPATIENT
Start: 2025-02-24 | End: 2025-02-24

## 2025-02-24 RX ORDER — GARLIC EXTRACT 500 MG
1 CAPSULE ORAL 2 TIMES DAILY
COMMUNITY

## 2025-02-24 RX ORDER — ONDANSETRON 2 MG/ML
4 INJECTION INTRAMUSCULAR; INTRAVENOUS EVERY 6 HOURS PRN
Status: DISCONTINUED | OUTPATIENT
Start: 2025-02-24 | End: 2025-02-26

## 2025-02-24 RX ORDER — ACETAMINOPHEN 500 MG
500 TABLET ORAL EVERY 4 HOURS PRN
Status: DISCONTINUED | OUTPATIENT
Start: 2025-02-24 | End: 2025-02-25

## 2025-02-24 NOTE — ED INITIAL ASSESSMENT (HPI)
Pt to ED w/ c/o abdominal pain x3 weeks, was seen at  and was told to go to ER for further evaluation. Denies any nausea/vomiting or fevers.

## 2025-02-24 NOTE — ED PROVIDER NOTES
Patient Seen in: [facility]      History     Chief Complaint   Patient presents with    Abdomen/Flank Pain     Stated Complaint: abd and chest pain    Subjective:   HPI      29-year-old female presents with epigastric pain/mid abd pain x 3 weeks.  She was on Zepbound medication for weight loss and had lost 20 pounds in 6 weeks.  She stopped the medication due to diarrhea.  She was seen and evaluated in the emergency department on February 2 for  diarrhea and abdominal pain.  She followed up with her GI specialist who ordered an outpatient CT.  She states that [facility] denied the CT scan ordered for abdominal pain because she did not have the appropriate labs and ultrasound first.  She contacted Dr. Simmons's office today and was informed that she should be evaluated for for cholecystitis and have LFTs drawn.     Objective:     No pertinent past medical history.            No pertinent past surgical history.              No pertinent social history.            Review of Systems    Positive for stated complaint: abd and chest pain  Other systems are as noted in HPI.  Constitutional and vital signs reviewed.      All other systems reviewed and negative except as noted above.    Physical Exam     ED Triage Vitals [02/24/25 1655]   /65   Pulse 66   Resp 16   Temp 97.9 °F (36.6 °C)   Temp src Oral   SpO2 97 %   O2 Device None (Room air)       Current Vitals:   Vital Signs  BP: 153/65  Pulse: 66  Resp: 16  Temp: 97.9 °F (36.6 °C)  Temp src: Oral    Oxygen Therapy  SpO2: 97 %  O2 Device: None (Room air)        Physical Exam  Vitals and nursing note reviewed.   Constitutional:       Appearance: She is well-developed. She is obese.   Cardiovascular:      Rate and Rhythm: Normal rate and regular rhythm.   Abdominal:      Tenderness: There is abdominal tenderness in the epigastric area. There is no rebound.   Neurological:      Mental Status: She is alert.             ED Course   Labs Reviewed - No data to  display                MDM               Medical Decision Making  29-year-old female presents with upper abdominal epigastric pain for 2 to 3 weeks  History of Zepbound DC'd recently  Reflux, cholecystitis/gallstones, pancreatitis, viral illness, dyspepsia, diverticulitis considered  Patient aware that we are unable to obtain LFTs and ultrasound of the gallbladder as requested by her specialist  She will go to the ER for higher level of care        Disposition and Plan     Clinical Impression:  1. Upper abdominal pain         Disposition:  Ic to ed  2/24/2025  5:18 pm    Follow-up:  No follow-up provider specified.        Medications Prescribed:  Discharge Medication List as of 2/24/2025  5:14 PM              Supplementary Documentation:

## 2025-02-24 NOTE — ED INITIAL ASSESSMENT (HPI)
Pt complaining of upper abdominal pain x 3 weeks, states initially she had diarrhea but that has resolved. States the pain is consistent, nothing makes it better or worse and its always there. No fever or vomiting. Per chart review pt was initially seen in ED and followed up with GI. Had a CT scheduled which was denied by insurance.

## 2025-02-25 ENCOUNTER — ANESTHESIA (OUTPATIENT)
Dept: SURGERY | Facility: HOSPITAL | Age: 30
End: 2025-02-25
Payer: COMMERCIAL

## 2025-02-25 ENCOUNTER — ANESTHESIA EVENT (OUTPATIENT)
Dept: SURGERY | Facility: HOSPITAL | Age: 30
End: 2025-02-25
Payer: COMMERCIAL

## 2025-02-25 LAB
ALBUMIN SERPL-MCNC: 4 G/DL (ref 3.2–4.8)
ALBUMIN/GLOB SERPL: 1.5 {RATIO} (ref 1–2)
ALP LIVER SERPL-CCNC: 55 U/L
ALT SERPL-CCNC: 20 U/L
ANION GAP SERPL CALC-SCNC: 9 MMOL/L (ref 0–18)
AST SERPL-CCNC: 21 U/L (ref ?–34)
BASOPHILS # BLD AUTO: 0.04 X10(3) UL (ref 0–0.2)
BASOPHILS NFR BLD AUTO: 0.9 %
BILIRUB SERPL-MCNC: 0.6 MG/DL (ref 0.3–1.2)
BUN BLD-MCNC: 7 MG/DL (ref 9–23)
BUN/CREAT SERPL: 9.9 (ref 10–20)
CALCIUM BLD-MCNC: 8.4 MG/DL (ref 8.7–10.4)
CHLORIDE SERPL-SCNC: 106 MMOL/L (ref 98–112)
CO2 SERPL-SCNC: 25 MMOL/L (ref 21–32)
CREAT BLD-MCNC: 0.71 MG/DL
DEPRECATED RDW RBC AUTO: 37.2 FL (ref 35.1–46.3)
EGFRCR SERPLBLD CKD-EPI 2021: 117 ML/MIN/1.73M2 (ref 60–?)
EOSINOPHIL # BLD AUTO: 0.18 X10(3) UL (ref 0–0.7)
EOSINOPHIL NFR BLD AUTO: 3.9 %
ERYTHROCYTE [DISTWIDTH] IN BLOOD BY AUTOMATED COUNT: 13.1 % (ref 11–15)
GLOBULIN PLAS-MCNC: 2.6 G/DL (ref 2–3.5)
GLUCOSE BLD-MCNC: 95 MG/DL (ref 70–99)
HCT VFR BLD AUTO: 35.4 %
HGB BLD-MCNC: 11.9 G/DL
IMM GRANULOCYTES # BLD AUTO: 0.01 X10(3) UL (ref 0–1)
IMM GRANULOCYTES NFR BLD: 0.2 %
LYMPHOCYTES # BLD AUTO: 1.8 X10(3) UL (ref 1–4)
LYMPHOCYTES NFR BLD AUTO: 38.6 %
MCH RBC QN AUTO: 26.6 PG (ref 26–34)
MCHC RBC AUTO-ENTMCNC: 33.6 G/DL (ref 31–37)
MCV RBC AUTO: 79.2 FL
MONOCYTES # BLD AUTO: 0.29 X10(3) UL (ref 0.1–1)
MONOCYTES NFR BLD AUTO: 6.2 %
NEUTROPHILS # BLD AUTO: 2.34 X10 (3) UL (ref 1.5–7.7)
NEUTROPHILS # BLD AUTO: 2.34 X10(3) UL (ref 1.5–7.7)
NEUTROPHILS NFR BLD AUTO: 50.2 %
OSMOLALITY SERPL CALC.SUM OF ELEC: 288 MOSM/KG (ref 275–295)
PLATELET # BLD AUTO: 231 10(3)UL (ref 150–450)
POTASSIUM SERPL-SCNC: 3.6 MMOL/L (ref 3.5–5.1)
PROT SERPL-MCNC: 6.6 G/DL (ref 5.7–8.2)
RBC # BLD AUTO: 4.47 X10(6)UL
SODIUM SERPL-SCNC: 140 MMOL/L (ref 136–145)
WBC # BLD AUTO: 4.7 X10(3) UL (ref 4–11)

## 2025-02-25 PROCEDURE — 99233 SBSQ HOSP IP/OBS HIGH 50: CPT | Performed by: HOSPITALIST

## 2025-02-25 PROCEDURE — 0FT44ZZ RESECTION OF GALLBLADDER, PERCUTANEOUS ENDOSCOPIC APPROACH: ICD-10-PCS | Performed by: SPECIALIST

## 2025-02-25 RX ORDER — BUPIVACAINE HYDROCHLORIDE 5 MG/ML
INJECTION, SOLUTION EPIDURAL; INTRACAUDAL AS NEEDED
Status: DISCONTINUED | OUTPATIENT
Start: 2025-02-25 | End: 2025-02-25 | Stop reason: HOSPADM

## 2025-02-25 RX ORDER — OXYCODONE HYDROCHLORIDE 5 MG/1
10 TABLET ORAL EVERY 4 HOURS PRN
Status: DISCONTINUED | OUTPATIENT
Start: 2025-02-25 | End: 2025-02-26

## 2025-02-25 RX ORDER — MORPHINE SULFATE 4 MG/ML
2 INJECTION, SOLUTION INTRAMUSCULAR; INTRAVENOUS EVERY 10 MIN PRN
Status: DISCONTINUED | OUTPATIENT
Start: 2025-02-25 | End: 2025-02-25

## 2025-02-25 RX ORDER — NALOXONE HYDROCHLORIDE 0.4 MG/ML
0.08 INJECTION, SOLUTION INTRAMUSCULAR; INTRAVENOUS; SUBCUTANEOUS AS NEEDED
Status: DISCONTINUED | OUTPATIENT
Start: 2025-02-25 | End: 2025-02-25 | Stop reason: HOSPADM

## 2025-02-25 RX ORDER — HYDROMORPHONE HYDROCHLORIDE 1 MG/ML
0.2 INJECTION, SOLUTION INTRAMUSCULAR; INTRAVENOUS; SUBCUTANEOUS EVERY 5 MIN PRN
Status: DISCONTINUED | OUTPATIENT
Start: 2025-02-25 | End: 2025-02-25

## 2025-02-25 RX ORDER — MIDAZOLAM HYDROCHLORIDE 1 MG/ML
INJECTION INTRAMUSCULAR; INTRAVENOUS AS NEEDED
Status: DISCONTINUED | OUTPATIENT
Start: 2025-02-25 | End: 2025-02-25 | Stop reason: SURG

## 2025-02-25 RX ORDER — SODIUM CHLORIDE, SODIUM LACTATE, POTASSIUM CHLORIDE, CALCIUM CHLORIDE 600; 310; 30; 20 MG/100ML; MG/100ML; MG/100ML; MG/100ML
INJECTION, SOLUTION INTRAVENOUS CONTINUOUS
Status: DISCONTINUED | OUTPATIENT
Start: 2025-02-25 | End: 2025-02-25 | Stop reason: HOSPADM

## 2025-02-25 RX ORDER — DEXAMETHASONE SODIUM PHOSPHATE 4 MG/ML
VIAL (ML) INJECTION AS NEEDED
Status: DISCONTINUED | OUTPATIENT
Start: 2025-02-25 | End: 2025-02-25 | Stop reason: SURG

## 2025-02-25 RX ORDER — HYDROMORPHONE HYDROCHLORIDE 1 MG/ML
0.2 INJECTION, SOLUTION INTRAMUSCULAR; INTRAVENOUS; SUBCUTANEOUS EVERY 5 MIN PRN
Status: DISCONTINUED | OUTPATIENT
Start: 2025-02-25 | End: 2025-02-25 | Stop reason: HOSPADM

## 2025-02-25 RX ORDER — MORPHINE SULFATE 4 MG/ML
4 INJECTION, SOLUTION INTRAMUSCULAR; INTRAVENOUS EVERY 10 MIN PRN
Status: DISCONTINUED | OUTPATIENT
Start: 2025-02-25 | End: 2025-02-25

## 2025-02-25 RX ORDER — HYDROMORPHONE HYDROCHLORIDE 1 MG/ML
0.4 INJECTION, SOLUTION INTRAMUSCULAR; INTRAVENOUS; SUBCUTANEOUS EVERY 5 MIN PRN
Status: DISCONTINUED | OUTPATIENT
Start: 2025-02-25 | End: 2025-02-25

## 2025-02-25 RX ORDER — MORPHINE SULFATE 10 MG/ML
6 INJECTION, SOLUTION INTRAMUSCULAR; INTRAVENOUS EVERY 10 MIN PRN
Status: DISCONTINUED | OUTPATIENT
Start: 2025-02-25 | End: 2025-02-25 | Stop reason: HOSPADM

## 2025-02-25 RX ORDER — LIDOCAINE HYDROCHLORIDE 10 MG/ML
INJECTION, SOLUTION EPIDURAL; INFILTRATION; INTRACAUDAL; PERINEURAL AS NEEDED
Status: DISCONTINUED | OUTPATIENT
Start: 2025-02-25 | End: 2025-02-25 | Stop reason: SURG

## 2025-02-25 RX ORDER — DEXTROSE MONOHYDRATE, SODIUM CHLORIDE, AND POTASSIUM CHLORIDE 50; .745; 4.5 G/1000ML; G/1000ML; G/1000ML
INJECTION, SOLUTION INTRAVENOUS CONTINUOUS
Status: DISCONTINUED | OUTPATIENT
Start: 2025-02-25 | End: 2025-02-26

## 2025-02-25 RX ORDER — HYDROMORPHONE HYDROCHLORIDE 1 MG/ML
0.8 INJECTION, SOLUTION INTRAMUSCULAR; INTRAVENOUS; SUBCUTANEOUS EVERY 2 HOUR PRN
Status: DISCONTINUED | OUTPATIENT
Start: 2025-02-25 | End: 2025-02-26

## 2025-02-25 RX ORDER — HYDROMORPHONE HYDROCHLORIDE 1 MG/ML
0.4 INJECTION, SOLUTION INTRAMUSCULAR; INTRAVENOUS; SUBCUTANEOUS EVERY 5 MIN PRN
Status: DISCONTINUED | OUTPATIENT
Start: 2025-02-25 | End: 2025-02-25 | Stop reason: HOSPADM

## 2025-02-25 RX ORDER — HYDROMORPHONE HYDROCHLORIDE 1 MG/ML
0.6 INJECTION, SOLUTION INTRAMUSCULAR; INTRAVENOUS; SUBCUTANEOUS EVERY 5 MIN PRN
Status: DISCONTINUED | OUTPATIENT
Start: 2025-02-25 | End: 2025-02-25

## 2025-02-25 RX ORDER — ONDANSETRON 2 MG/ML
INJECTION INTRAMUSCULAR; INTRAVENOUS AS NEEDED
Status: DISCONTINUED | OUTPATIENT
Start: 2025-02-25 | End: 2025-02-25 | Stop reason: SURG

## 2025-02-25 RX ORDER — HYDROMORPHONE HYDROCHLORIDE 1 MG/ML
0.4 INJECTION, SOLUTION INTRAMUSCULAR; INTRAVENOUS; SUBCUTANEOUS EVERY 2 HOUR PRN
Status: DISCONTINUED | OUTPATIENT
Start: 2025-02-25 | End: 2025-02-26

## 2025-02-25 RX ORDER — HYDROMORPHONE HYDROCHLORIDE 1 MG/ML
0.6 INJECTION, SOLUTION INTRAMUSCULAR; INTRAVENOUS; SUBCUTANEOUS EVERY 5 MIN PRN
Status: DISCONTINUED | OUTPATIENT
Start: 2025-02-25 | End: 2025-02-25 | Stop reason: HOSPADM

## 2025-02-25 RX ORDER — ENOXAPARIN SODIUM 100 MG/ML
40 INJECTION SUBCUTANEOUS DAILY
Status: DISCONTINUED | OUTPATIENT
Start: 2025-02-26 | End: 2025-02-26

## 2025-02-25 RX ORDER — SODIUM CHLORIDE, SODIUM LACTATE, POTASSIUM CHLORIDE, CALCIUM CHLORIDE 600; 310; 30; 20 MG/100ML; MG/100ML; MG/100ML; MG/100ML
INJECTION, SOLUTION INTRAVENOUS CONTINUOUS PRN
Status: DISCONTINUED | OUTPATIENT
Start: 2025-02-25 | End: 2025-02-25 | Stop reason: SURG

## 2025-02-25 RX ORDER — OXYCODONE HYDROCHLORIDE 5 MG/1
5 TABLET ORAL EVERY 4 HOURS PRN
Status: DISCONTINUED | OUTPATIENT
Start: 2025-02-25 | End: 2025-02-26

## 2025-02-25 RX ORDER — ROCURONIUM BROMIDE 10 MG/ML
INJECTION, SOLUTION INTRAVENOUS AS NEEDED
Status: DISCONTINUED | OUTPATIENT
Start: 2025-02-25 | End: 2025-02-25 | Stop reason: SURG

## 2025-02-25 RX ADMIN — SODIUM CHLORIDE, SODIUM LACTATE, POTASSIUM CHLORIDE, CALCIUM CHLORIDE: 600; 310; 30; 20 INJECTION, SOLUTION INTRAVENOUS at 12:53:00

## 2025-02-25 RX ADMIN — METRONIDAZOLE 500 MG: 500 INJECTION, SOLUTION INTRAVENOUS at 13:01:00

## 2025-02-25 RX ADMIN — ONDANSETRON 4 MG: 2 INJECTION INTRAMUSCULAR; INTRAVENOUS at 13:33:00

## 2025-02-25 RX ADMIN — ROCURONIUM BROMIDE 50 MG: 10 INJECTION, SOLUTION INTRAVENOUS at 12:59:00

## 2025-02-25 RX ADMIN — LIDOCAINE HYDROCHLORIDE 50 MG: 10 INJECTION, SOLUTION EPIDURAL; INFILTRATION; INTRACAUDAL; PERINEURAL at 12:56:00

## 2025-02-25 RX ADMIN — DEXAMETHASONE SODIUM PHOSPHATE 8 MG: 4 MG/ML VIAL (ML) INJECTION at 13:12:00

## 2025-02-25 RX ADMIN — MIDAZOLAM HYDROCHLORIDE 2 MG: 1 INJECTION INTRAMUSCULAR; INTRAVENOUS at 12:53:00

## 2025-02-25 NOTE — H&P
[facility]  part of [facility]    History & Physical    Racheal Clemente Patient Status:  Emergency    1995 MRN [id number]   Location [facility] EMERGENCY DEPARTMENT Attending No att. providers found   Hosp Day # 0 PCP No primary care provider on file.     Date:  2025  Date of Admission:  2025    History provided by:patient  Chief Complaint:     Chief Complaint   Patient presents with    Abdominal Pain       HPI:   Racheal Clemente is a 29 year old female with a history of obesity on Zepbound, who presents with abdominal pain for 3 weeks. Patient was recently started on Zepbound for weight loss in 224. She has lost 20 pounds in 6 weeks, but developed abdominal discomfort, abdominal pain and diarrhea. She went to ED for it on 25. Workup unremarkable, and was discharged home. She since stopped the Zepbound. She then went to see Dr. Simmons from GI on 25. CT A/P was ordered, but was denied by her insurance. In the past 3 weeks, the intermittent epigastric pain gradually worsened. She denied fever, chills, vomiting, dysuria, cough or chest pain. In ED, patient has normal vitals, no fever or tachycardia. Blood work normal, including liver enzymes, no leukocytosis. US showed cholelithiasis with gallbladder wall thickening and reportedly positive sonographic Lechuga sign. Ceftriaxone and metronidazole given. Surgery consulted.    History   History reviewed. No pertinent past medical history.  History reviewed. No pertinent surgical history.  Family History   Problem Relation Age of Onset    Other (prediabetes) Mother      Social History:  Social History     Socioeconomic History    Marital status: Single   Tobacco Use    Smoking status: Never    Smokeless tobacco: Never   Vaping Use    Vaping status: Never Used   Substance and Sexual Activity    Alcohol use: Yes    Drug use: Never     Social Drivers of Health     Food Insecurity: Not on File (2024)     Received from [facility]    Food Insecurity     Food: 0   Transportation Needs: Not on File (2022)    Received from [facility]    Transportation Needs     Transportation: 0   Housing Stability: Not on File (2022)    Received from [facility]    Housing Stability     Housin     Allergies/Medications:   Allergies: Allergies[1]  (Not in a hospital admission)      Review of Systems:   Negative except depicted in HPI.    A comprehensive 12 point review of systems was completed.  Pertinent positives and negatives noted in the the HPI.    Physical Exam:   Vital Signs:  Blood pressure 135/71, pulse 81, temperature 98.5 °F (36.9 °C), temperature source Temporal, resp. rate 17, last menstrual period 2025, SpO2 100%, not currently breastfeeding.     GENERAL:  The patient appeared to be in  distress.    SKIN:  Warm and hydrated  HEENT:  Head was atraumatic and normocephalic.  Eyes:  Extraocular muscles were intact.  Sclera was anicteric.  Pupils were equally reactive to light.  Ears:  There were no lesions.  Nose:  No lesions were noted.   NECK:  Supple.  There was no JVD.   CHEST:  Symmetrical movement on inspiration  HEART:  S1 and S2 heard.  RRR   LUNGS:  Air entry was good.  No crackles or wheezes   ABDOMEN: Soft, epigastric pain.  MUSCULOSKELETAL:  There was no deformity.  There was full range of motion in all the extremities.   EXTREMITIES: There was no edema, clubbing or cyanosis  NEUROLOGICAL:  There was no focal deficit.  Cranial nerves II through XII were intact.  PSYCHIATRIC: Calm and cooperative     Results:     Lab Results   Component Value Date    WBC 8.1 2025    HGB 13.4 2025    HCT 38.9 2025    .0 2025    CREATSERUM 0.69 2025    BUN 10 2025     2025    K 3.5 2025     2025    CO2 24.0 2025    GLU 93 2025    CA 9.2 2025    ALB 4.7 2025    ALKPHO 64 2025    BILT 0.5 2025    TP 7.7  02/24/2025    AST 25 02/24/2025    ALT 25 02/24/2025    TSH 1.680 02/09/2024    LIP 47 02/24/2025    B12 478 02/09/2024       US GALLBLADDER (CPT=76705)    Result Date: 2/24/2025  CONCLUSION:  1. Cholelithiasis with gallbladder wall thickening and reportedly positive sonographic Lechuga sign. These findings are nonspecific, but could reflect acute cholecystitis in the appropriate clinical scenario; if clinically warranted, follow-up radionuclide hepatobiliary scanning could be pursued.  2. Negative for hepatobiliary dilatation.  3. Sonographic features of hepatic steatosis.   Dictated by (CST): Rogelio Eaton MD on 2/24/2025 at 7:48 PM     Finalized by (CST): Rogelio Eaton MD on 2/24/2025 at 7:50 PM               Assessment/Plan:   Racheal Clemente is a 29-year-old female with a history of obesity on Zepbound, who presents with abdominal pain for 3 weeks.     # Cholecystitis  # Cholelithiasis  - US showed cholelithiasis with gallbladder wall thickening and reportedly positive sonographic Lechuga sign.   - Patient has no fever or leukocytosis, with normal liver enzymes.  - Ceftriaxone and metronidazole given in ED, will continue.  - Surgery consulted.    # Obesity w/ BMI 34  - on Zepbound 12/2024-01/2025. Lost 20 pounds in 6 weeks    Diet: NPO  PT/OT: not indicated  DVT ppx: ambulation  Line: none  Code status: Full  Dispo: expect less than 2 midnights    MDM: high Dandy Manriquez MD, PhD  Message via Secure Chat  [facility] Hospitalist        [1]   Allergies  Allergen Reactions    Cat Hair Extract Coughing, ITCHING, RASH and SHORTNESS OF BREATH    Dander UNKNOWN     dogs    Mushrooms RASH     Around mouth

## 2025-02-25 NOTE — PROGRESS NOTES
[facility]  part of [facility]    Progress Note    Racheal Clemente Patient Status:  Observation    1995 MRN [id number]   Location [facility] 4W/SW/SE Attending Olvin Draper MD   Hosp Day # 0 PCP No primary care provider on file.     Chief Complaint:   Chief Complaint   Patient presents with    Abdominal Pain   Upper abd pain x 3 weeks     Subjective:   Racheal Clemente is drowsy. Having 7/10 pain. No N/V. Family at bedside.       Objective:   Objective:    Blood pressure 141/78, pulse 71, temperature 98.5 °F (36.9 °C), temperature source Oral, resp. rate 19, weight 188 lb 12.8 oz (85.6 kg), last menstrual period 2025, SpO2 96%, not currently breastfeeding.    Physical Exam:    General: No acute distress.   Respiratory: Clear to auscultation bilaterally. No wheezes. No rhonchi.  Cardiovascular: S1, S2. Regular rate and rhythm. No murmurs, rubs or gallops.   Abdomen: Soft, nontender, nondistended.  Positive bowel sounds. No rebound or guarding.  Neurologic: No focal neurological deficits.   Musculoskeletal: Moves all extremities.  Extremities: No edema.      Results:   Results:    Labs:  Recent Labs   Lab 25  1921 25  0619   WBC 8.1 4.7   HGB 13.4 11.9*   MCV 77.6* 79.2*   .0 231.0       Recent Labs   Lab 25  1845 25  0619   GLU 93 95   BUN 10 7*   CREATSERUM 0.69 0.71   CA 9.2 8.4*   ALB 4.7 4.0    140   K 3.5 3.6    106   CO2 24.0 25.0   ALKPHO 64 55   AST 25 21   ALT 25 20   BILT 0.5 0.6   TP 7.7 6.6       Estimated Creatinine Clearance: 95 mL/min (based on SCr of 0.71 mg/dL).    No results for input(s): \"PTP\", \"INR\" in the last 168 hours.         Culture:  No results found for this visit on 25.    Cardiac  No results for input(s): \"TROP\", \"PBNP\" in the last 168 hours.      Imaging: Imaging data reviewed in Epic.  US GALLBLADDER (CPT=76705)    Result Date: 2025  CONCLUSION:  1. Cholelithiasis with  gallbladder wall thickening and reportedly positive sonographic Lechuga sign. These findings are nonspecific, but could reflect acute cholecystitis in the appropriate clinical scenario; if clinically warranted, follow-up radionuclide hepatobiliary scanning could be pursued.  2. Negative for hepatobiliary dilatation.  3. Sonographic features of hepatic steatosis.   Dictated by (CST): Rogelio Eaton MD on 2/24/2025 at 7:48 PM     Finalized by (CST): Rogelio Eaton MD on 2/24/2025 at 7:50 PM           Medications:    [START ON 2/26/2025] enoxaparin  40 mg Subcutaneous Daily    cefTRIAXone  2 g Intravenous Q24H    metroNIDAZOLE  500 mg Intravenous q12h         Assessment and Plan:   Assessment & Plan:      Acute cholecystitis   Cholelithiasis   S/p laparoscopic cholecystectomy 2/25  LFT's normal.   Diet per surgery   Pain control  IV rocephin + flagyl.  Obesity BMI 34   Hold zepbound        >55min spent, >50% spent counseling and coordinating care in the form of educating pt/family and d/w consultants and staff. Most of the time spent discussing the above plan.        Plan of care discussed with patient or family at bedside.    Olvin Draper MD  Hospitalist          Supplementary Documentation:     Quality:  DVT Prophylaxis: lovenox   CODE status: Full   Dispo: per clinical course            Estimated date of discharge: TBD  Discharge is dependent on: clinical stability  At this point Ms. Clemente is expected to be discharge to: home

## 2025-02-25 NOTE — TELEPHONE ENCOUNTER
Thank you Alban.    This inappropriate and infantile gamesmanship is so discouraging.  CT scan denied.    I did send her for labs the day of our office visit, with LFTs normal on 2/18/2025.    I opened her chart and it looks like Racheal ended up in the ER last night and admitted.    \"FINDINGS:   GALLBLADDER:   Normal size with evidence of echogenic, shadowing intraluminal calculi and wall thickening measuring 0.4 cm. There is no significant pericholecystic fluid. According to the technologist performing the study, the sonographic Lechuga sign was elicited. \"    LFTs were again normal last night and this morning.  Reassuring WBC and CBC.    Looks like she just underwent cholecystectomy surgery for the above findings this afternoon.    Okay to close this chart.    - cb

## 2025-02-25 NOTE — PLAN OF CARE
Racheal is aware of the POC at this time.    Pt back from ani calles this afternoon. She is tolerating sips of clears. Dilaudid given PRN for pain. Surgical dressings C/D/I.    Problem: Patient Centered Care  Goal: Patient preferences are identified and integrated in the patient's plan of care  Description: Interventions:  - What would you like us to know as we care for you?   - Provide timely, complete, and accurate information to patient/family  - Incorporate patient and family knowledge, values, beliefs, and cultural backgrounds into the planning and delivery of care  - Encourage patient/family to participate in care and decision-making at the level they choose  - Honor patient and family perspectives and choices  Outcome: Progressing     Problem: PAIN - ADULT  Goal: Verbalizes/displays adequate comfort level or patient's stated pain goal  Description: INTERVENTIONS:  - Encourage pt to monitor pain and request assistance  - Assess pain using appropriate pain scale  - Administer analgesics based on type and severity of pain and evaluate response  - Implement non-pharmacological measures as appropriate and evaluate response  - Consider cultural and social influences on pain and pain management  - Manage/alleviate anxiety  - Utilize distraction and/or relaxation techniques  - Monitor for opioid side effects  - Notify MD/LIP if interventions unsuccessful or patient reports new pain  - Anticipate increased pain with activity and pre-medicate as appropriate  Outcome: Progressing     Problem: DISCHARGE PLANNING  Goal: Discharge to home or other facility with appropriate resources  Description: INTERVENTIONS:  - Identify barriers to discharge w/pt and caregiver  - Include patient/family/discharge partner in discharge planning  - Arrange for needed discharge resources and transportation as appropriate  - Identify discharge learning needs (meds, wound care, etc)  - Arrange for interpreters to assist at discharge as  needed  - Consider post-discharge preferences of patient/family/discharge partner  - Complete POLST form as appropriate  - Assess patient's ability to be responsible for managing their own health  - Refer to Case Management Department for coordinating discharge planning if the patient needs post-hospital services based on physician/LIP order or complex needs related to functional status, cognitive ability or social support system  Outcome: Progressing

## 2025-02-25 NOTE — ANESTHESIA PROCEDURE NOTES
Airway  Date/Time: 2/25/2025 1:01 PM  Urgency: elective    Airway not difficult    General Information and Staff    Patient location during procedure: OR  Resident/CRNA: Socorro Rowan CRNA  Performed: CRNA   Performed by: Socorro Rowan CRNA  Authorized by: Jackie Palmer MD      Indications and Patient Condition  Indications for airway management: anesthesia  Spontaneous ventilation: present  Sedation level: deep  Preoxygenated: yes  Patient position: sniffing  MILS maintained throughout  Mask difficulty assessment: 1 - vent by mask  No planned trial extubation    Final Airway Details  Final airway type: endotracheal airway      Successful airway: ETT  Cuffed: yes   Successful intubation technique: direct laryngoscopy  Endotracheal tube insertion site: oral  Blade: Taz  Blade size: #3  ETT size (mm): 7.5    Cormack-Lehane Classification: grade I - full view of glottis  Placement verified by: capnometry   Cuff volume (mL): 7  Measured from: lips  ETT to lips (cm): 222  Number of attempts at approach: 1  Number of other approaches attempted: 0

## 2025-02-25 NOTE — ED QUICK NOTES
Orders for admission, patient is aware of plan and ready to go upstairs. Any questions, please call ED RN Michelle at extension [phone].     Patient Covid vaccination status: Fully vaccinated     COVID Test Ordered in ED: None    COVID Suspicion at Admission: N/A    Running Infusions:  None    Mental Status/LOC at time of transport: AOx4    Other pertinent information: from home, up ad theodore  CIWA score: N/A   NIH score:  N/A

## 2025-02-25 NOTE — ED PROVIDER NOTES
Patient Seen in: [facility] Emergency Department      History     Chief Complaint   Patient presents with    Abdominal Pain     Stated Complaint: persistant abdominal pain    Subjective:   28yo/f w no chronic medical problems reports to the ED w 3 weeks of persistent epigastric pain. No chest pain. No flank pain. No urinary symptoms. No cough or congestion. No trauma. Reports she was seen earlier today at  and sent here to r/o biliary colic/cholecysitis. No hx of abdominal surgery. No cough or congestion.               Objective:     History reviewed. No pertinent past medical history.           History reviewed. No pertinent surgical history.             Social History     Socioeconomic History    Marital status: Single   Tobacco Use    Smoking status: Never    Smokeless tobacco: Never   Vaping Use    Vaping status: Never Used   Substance and Sexual Activity    Alcohol use: Yes    Drug use: Never     Social Drivers of Health     Food Insecurity: Not on File (2024)    Received from [facility]    Food Insecurity     Food: 0   Transportation Needs: Not on File (2022)    Received from [facility]    Transportation Needs     Transportation: 0   Housing Stability: Not on File (2022)    Received from [facility]    Housing Stability     Housin                  Physical Exam     ED Triage Vitals [25 1756]   /81   Pulse 71   Resp 20   Temp 98.5 °F (36.9 °C)   Temp src Temporal   SpO2 99 %   O2 Device None (Room air)       Current Vitals:   Vital Signs  BP: 135/71  Pulse: 81  Resp: 17  Temp: 98.5 °F (36.9 °C)  Temp src: Temporal    Oxygen Therapy  SpO2: 100 %  O2 Device: None (Room air)        Physical Exam  Vitals and nursing note reviewed.   Constitutional:       General: She is not in acute distress.     Appearance: She is well-developed.   HENT:      Head: Normocephalic and atraumatic.      Nose: Nose normal.      Mouth/Throat:      Mouth: Mucous membranes are moist.   Eyes:       Conjunctiva/sclera: Conjunctivae normal.      Pupils: Pupils are equal, round, and reactive to light.   Cardiovascular:      Rate and Rhythm: Normal rate and regular rhythm.      Heart sounds: Normal heart sounds.   Pulmonary:      Effort: Pulmonary effort is normal.      Breath sounds: Normal breath sounds.   Abdominal:      General: Bowel sounds are normal.      Palpations: Abdomen is soft.      Tenderness: There is abdominal tenderness in the epigastric area.   Musculoskeletal:         General: No tenderness or deformity. Normal range of motion.      Cervical back: Normal range of motion and neck supple.   Skin:     General: Skin is warm and dry.      Capillary Refill: Capillary refill takes less than 2 seconds.      Findings: No rash.      Comments: Normal color   Neurological:      General: No focal deficit present.      Mental Status: She is alert and oriented to person, place, and time.      GCS: GCS eye subscore is 4. GCS verbal subscore is 5. GCS motor subscore is 6.      Cranial Nerves: No cranial nerve deficit.      Gait: Gait normal.             ED Course     Labs Reviewed   CBC WITH DIFFERENTIAL WITH PLATELET - Abnormal; Notable for the following components:       Result Value    MCV 77.6 (*)     All other components within normal limits   COMP METABOLIC PANEL (14) - Normal   LIPASE - Normal   POCT PREGNANCY URINE - Normal              Impression  CONCLUSION:  1. Cholelithiasis with gallbladder wall thickening and reportedly positive sonographic Lechuga sign. These findings are nonspecific, but could reflect acute cholecystitis in the appropriate clinical scenario; if clinically warranted, follow-up  radionuclide hepatobiliary scanning could be pursued.     2. Negative for hepatobiliary dilatation.     3. Sonographic features of hepatic steatosis.        Dictated by (CST): Rogelio Eaton MD on 2/24/2025 at 7:48 PM      Finalized by (CST): Rogelio Eaton MD on 2/24/2025 at 7:50 PM         MDM           Admission disposition: 2/24/2025  8:23 PM           Medical Decision Making  28yo/f w hx and exam as stated; epigastric/ruq pain    On and off but worsening x 3 weeks  US as above  Normal lfts, lipase  No white count  Nausea  Pain not improved in ed  No chest pain    Plan  Admit to obs  Surg eval        Amount and/or Complexity of Data Reviewed  Labs:  Decision-making details documented in ED Course.  Radiology:  Decision-making details documented in ED Course.  Discussion of management or test interpretation with external provider(s): Discussed w Dr Osborn who will consult  Dr. Khan will admit      Risk  OTC drugs.  Prescription drug management.        Disposition and Plan     Clinical Impression:  1. Biliary colic         Disposition:  Admit  2/24/2025  8:23 pm    Follow-up:  No follow-up provider specified.        Medications Prescribed:  Current Discharge Medication List              Supplementary Documentation:         Hospital Problems       Present on Admission  Date Reviewed: 2/24/2025   None

## 2025-02-25 NOTE — ANESTHESIA POSTPROCEDURE EVALUATION
Patient: Racheal Clemente    Procedure Summary       Date: 02/25/25 Room / Location: [facility] MAIN OR  / [facility] MAIN OR    Anesthesia Start: 1253 Anesthesia Stop:     Procedure: LAPAROSCOPIC CHOLECYSTECTOMY (Abdomen) Diagnosis: (Biliary colic)    Surgeons: Santo Osborn MD Anesthesiologist: Collin Gil MD    Anesthesia Type: general ASA Status: 2            Anesthesia Type: general    Vitals Value Taken Time   /93 02/25/25 1402   Temp 97.3 02/25/25 1402   Pulse 92 02/25/25 1402   Resp 7 02/25/25 1402   SpO2 94 % 02/25/25 1402   Vitals shown include unfiled device data.    [facility] AN Post Evaluation:   Patient Evaluated in PACU  Patient Participation: complete - patient participated  Level of Consciousness: awake and alert  Pain Score: 0  Pain Management: adequate  Airway Patency:patent  Dental exam unchanged from preop  Yes    Nausea/Vomiting: none  Cardiovascular Status: acceptable  Respiratory Status: acceptable  Postoperative Hydration acceptable      CLEMENTINE DE LA FUENTE CRNA  2/25/2025 2:02 PM

## 2025-02-25 NOTE — PLAN OF CARE
Patient admitted to unit from ED. Alert and oriented. Room air. Vital signs stable. NPO. IVF and abx continued. Tylenol given prn for pain. Up independently. Call light and personal belongings within reach. Safety precautions in place.

## 2025-02-25 NOTE — CONSULTS
[facility]  part of [facility]    Report of Consultation    Racheal Clemente Patient Status:  Observation    1995 MRN [id number]   Location [facility] 4W/SW/SE Attending Olvin Draper MD   Hosp Day # 0 PCP No primary care provider on file.     Date of Admission:  2025  Date of Consult:  2025    Reason for Consultation:  Abd pain     History of Present Illness:  Racheal Clemente is a a(n) 29 year old female. States about 3 day hx of epigastric pain associated with diarrhea   was seen in ED  and states w/u neg  Saw Dr. Simmons   ordered CT  but not done due to insurance       History:  History reviewed. No pertinent past medical history.  History reviewed. No pertinent surgical history.  Family History   Problem Relation Age of Onset    Other (prediabetes) Mother       reports that she has never smoked. She has never used smokeless tobacco. She reports current alcohol use. She reports that she does not use drugs.    Allergies:  Allergies[1]    Medications:    Current Facility-Administered Medications:     potassium chloride 20 mEq in dextrose 5%-sodium chloride 0.9% 1000mL infusion premix, , Intravenous, Continuous    acetaminophen (Tylenol Extra Strength) tab 500 mg, 500 mg, Oral, Q4H PRN    morphINE PF 2 MG/ML injection 1 mg, 1 mg, Intravenous, Q2H PRN **OR** morphINE PF 2 MG/ML injection 2 mg, 2 mg, Intravenous, Q2H PRN **OR** morphINE PF 4 MG/ML injection 4 mg, 4 mg, Intravenous, Q2H PRN    polyethylene glycol (PEG 3350) (Miralax) 17 g oral packet 17 g, 17 g, Oral, Daily PRN    sennosides (Senokot) tab 17.2 mg, 17.2 mg, Oral, Nightly PRN    bisacodyl (Dulcolax) 10 MG rectal suppository 10 mg, 10 mg, Rectal, Daily PRN    fleet enema (Fleet) rectal enema 133 mL, 1 enema, Rectal, Once PRN    ondansetron (Zofran) 4 MG/2ML injection 4 mg, 4 mg, Intravenous, Q6H PRN    prochlorperazine (Compazine) 10 MG/2ML injection 5 mg, 5 mg, Intravenous, Q8H PRN     benzonatate (Tessalon) cap 200 mg, 200 mg, Oral, TID PRN    cefTRIAXone (Rocephin) 2 g in sodium chloride 0.9% 100 mL IVPB-ADDV, 2 g, Intravenous, Q24H    metroNIDAZOLE in sodium chloride 0.79% (Flagyl) 5 mg/mL IVPB premix 500 mg, 500 mg, Intravenous, q12h  Prescriptions Prior to Admission[2]    Review of Systems:  A ten point review of systems was negative except as noted.    Physical Exam:  Blood pressure 95/49, pulse 72, temperature 98.2 °F (36.8 °C), temperature source Oral, resp. rate 18, weight 188 lb 12.8 oz (85.6 kg), last menstrual period 02/13/2025, SpO2 100%, not currently breastfeeding.    General: Alert, orientated x3.  Cooperative.  No apparent distress.  HEENT: Exam is unremarkable.    Lungs: per attending  Cardiac: per attending  Abdomen:  Soft, non-distended, somewhat obese  vague ruq tenderness  Skin: Normal texture and turgor.  Neurologic: per attending  Laboratory Data:  Lab Results   Component Value Date    WBC 4.7 02/25/2025    HGB 11.9 (L) 02/25/2025    HCT 35.4 02/25/2025    .0 02/25/2025    CREATSERUM 0.71 02/25/2025    BUN 7 (L) 02/25/2025     02/25/2025    K 3.6 02/25/2025     02/25/2025    CO2 25.0 02/25/2025    GLU 95 02/25/2025    CA 8.4 (L) 02/25/2025    ALB 4.0 02/25/2025    ALKPHO 55 02/25/2025    BILT 0.6 02/25/2025    TP 6.6 02/25/2025    AST 21 02/25/2025    ALT 20 02/25/2025    TSH 1.680 02/09/2024    LIP 47 02/24/2025    B12 478 02/09/2024       Imaging:  US GALLBLADDER (CPT=76705)    Result Date: 2/24/2025  PROCEDURE: US GALLBLADDER (CPT=76705)  COMPARISON: None available.  INDICATIONS: Persistent right upper quadrant abdominal pain.  TECHNIQUE:   The gallbladder was evaluated with grayscale ultrasound.   FINDINGS:  GALLBLADDER:   Normal size with evidence of echogenic, shadowing intraluminal calculi and wall thickening measuring 0.4 cm. There is no significant pericholecystic fluid. According to the technologist performing the study, the sonographic Lechuga  sign was  elicited. BILE DUCTS:   Negative for dilatation. The common bile duct measures 0.2 cm.  OTHER:   Visualized portions of the hepatic parenchyma are notable for diffusely increased parenchymal echogenicity and heterogeneous, coarse echotexture, compatible with hepatic steatosis.  Sagittal survey images of the 11.3 cm length right kidney reveal no collecting system dilatation.          CONCLUSION:  1. Cholelithiasis with gallbladder wall thickening and reportedly positive sonographic Lechuga sign. These findings are nonspecific, but could reflect acute cholecystitis in the appropriate clinical scenario; if clinically warranted, follow-up radionuclide hepatobiliary scanning could be pursued.  2. Negative for hepatobiliary dilatation.  3. Sonographic features of hepatic steatosis.   Dictated by (CST): Rogelio Eaton MD on 2/24/2025 at 7:48 PM     Finalized by (CST): Rogelio Eaton MD on 2/24/2025 at 7:50 PM            Impression and Plan:  Patient Active Problem List   Diagnosis    Class 2 severe obesity with serious comorbidity and body mass index (BMI) of 36.0 to 36.9 in adult ([facility])    Encounter for therapeutic drug monitoring    Hypertriglyceridemia    Insulin resistance    Heartburn    Biliary colic   LFT's neg  US   + for cholelithiasis  cholecystitis   CBD ok   For LC    HEIDY GLASS MD  2/25/2025  9:21 AM         [1]   Allergies  Allergen Reactions    Cat Hair Extract Coughing, ITCHING, RASH and SHORTNESS OF BREATH    Dander UNKNOWN     dogs    Mushrooms RASH     Around mouth   [2]   Medications Prior to Admission   Medication Sig Dispense Refill Last Dose/Taking    acidophilus-pectin Oral Cap Take 1 capsule by mouth 2 (two) times daily.   2/23/2025 at  9:00 PM    FIBER ADULT GUMMIES OR Take 3 Pieces by mouth daily.   2/23/2025 at  9:00 PM

## 2025-02-25 NOTE — ANESTHESIA PREPROCEDURE EVALUATION
Anesthesia PreOp Note    HPI:     Racheal Clemente is a 29 year old female who presents for preoperative consultation requested by: Santo Osborn MD    Date of Surgery: 2/24/2025 - 2/25/2025    Procedure(s):  LAPAROSCOPIC CHOLECYSTECTOMY  Indication: Biliary colic    Relevant Problems   No relevant active problems       NPO:                         History Review:  Patient Active Problem List    Diagnosis Date Noted    Biliary colic 02/24/2025    Encounter for therapeutic drug monitoring 12/17/2024    Hypertriglyceridemia 12/17/2024    Insulin resistance 12/17/2024    Heartburn 12/17/2024    Class 2 severe obesity with serious comorbidity and body mass index (BMI) of 36.0 to 36.9 in adult ([facility]) 02/09/2024       History reviewed. No pertinent past medical history.    History reviewed. No pertinent surgical history.    Prescriptions Prior to Admission[1]  Current Medications and Prescriptions Ordered in Epic[2]    Allergies[3]    Family History   Problem Relation Age of Onset    Other (prediabetes) Mother      Social History     Socioeconomic History    Marital status: Single   Tobacco Use    Smoking status: Never    Smokeless tobacco: Never   Vaping Use    Vaping status: Never Used   Substance and Sexual Activity    Alcohol use: Yes     Comment: socially    Drug use: Never       Available pre-op labs reviewed.  Lab Results   Component Value Date    WBC 4.7 02/25/2025    RBC 4.47 02/25/2025    HGB 11.9 (L) 02/25/2025    HCT 35.4 02/25/2025    MCV 79.2 (L) 02/25/2025    MCH 26.6 02/25/2025    MCHC 33.6 02/25/2025    RDW 13.1 02/25/2025    .0 02/25/2025    URINEPREG Negative 02/24/2025     Lab Results   Component Value Date     02/25/2025    K 3.6 02/25/2025     02/25/2025    CO2 25.0 02/25/2025    BUN 7 (L) 02/25/2025    CREATSERUM 0.71 02/25/2025    GLU 95 02/25/2025    CA 8.4 (L) 02/25/2025          Vital Signs:  Body mass index is 33.44 kg/m².   weight is 85.6 kg (188 lb 12.8 oz). Her  oral temperature is 98.2 °F (36.8 °C). Her blood pressure is 95/49 and her pulse is 72. Her respiration is 18 and oxygen saturation is 100%.   Vitals:    02/24/25 1932 02/24/25 2052 02/24/25 2127 02/25/25 0428   BP: 135/71 144/74 140/86 95/49   Pulse: 81 77 74 72   Resp: 17 15 17 18   Temp:   98 °F (36.7 °C) 98.2 °F (36.8 °C)   TempSrc:   Oral Oral   SpO2: 100% 99% 98% 100%   Weight:   85.6 kg (188 lb 12.8 oz)         Anesthesia Evaluation     Patient summary reviewed and Nursing notes reviewed    No history of anesthetic complications   Airway   Mallampati: I  TM distance: >3 FB  Neck ROM: full  Dental - Dentition appears grossly intact     Pulmonary - negative ROS and normal exam   Cardiovascular - negative ROS and normal exam    Neuro/Psych - negative ROS     GI/Hepatic/Renal      Comments: cholecystitis    Endo/Other    Abdominal                  Anesthesia Plan:   ASA:  2  Plan:   General  Informed Consent Plan and Risks Discussed With:  Patient      I have informed Racheal Clemente and/or legal guardian or family member of the nature of the anesthetic plan, benefits, risks including possible dental damage if relevant, major complications, and any alternative forms of anesthetic management.   All of the patient's questions were answered to the best of my ability. The patient desires the anesthetic management as planned.  Jackie Palmer MD  2/25/2025 12:03 PM  Present on Admission:  **None**           [1]   Medications Prior to Admission   Medication Sig Dispense Refill Last Dose/Taking    acidophilus-pectin Oral Cap Take 1 capsule by mouth 2 (two) times daily.   2/23/2025 at  9:00 PM    FIBER ADULT GUMMIES OR Take 3 Pieces by mouth daily.   2/23/2025 at  9:00 PM   [2]   Current Facility-Administered Medications Ordered in Epic   Medication Dose Route Frequency Provider Last Rate Last Admin    potassium chloride 20 mEq in dextrose 5%-sodium chloride 0.9% 1000mL infusion premix   Intravenous Continuous Declan  MD Dandy 100 mL/hr at 02/25/25 0945 New Bag at 02/25/25 0945    acetaminophen (Tylenol Extra Strength) tab 500 mg  500 mg Oral Q4H PRN Dandy Manriquez MD   500 mg at 02/24/25 2156    morphINE PF 2 MG/ML injection 1 mg  1 mg Intravenous Q2H PRN Dandy Manriquez MD        Or    morphINE PF 2 MG/ML injection 2 mg  2 mg Intravenous Q2H PRN Dandy Manriquez MD        Or    morphINE PF 4 MG/ML injection 4 mg  4 mg Intravenous Q2H PRN Dandy Manriquez MD        polyethylene glycol (PEG 3350) (Miralax) 17 g oral packet 17 g  17 g Oral Daily PRN Dandy Manriquez MD        sennosides (Senokot) tab 17.2 mg  17.2 mg Oral Nightly PRN Dandy Manriquez MD        bisacodyl (Dulcolax) 10 MG rectal suppository 10 mg  10 mg Rectal Daily PRN Dandy Manriquez MD        fleet enema (Fleet) rectal enema 133 mL  1 enema Rectal Once PRN Dandy Manriquez MD        ondansetron (Zofran) 4 MG/2ML injection 4 mg  4 mg Intravenous Q6H PRN Dandy Manriquez MD        prochlorperazine (Compazine) 10 MG/2ML injection 5 mg  5 mg Intravenous Q8H PRN Dandy Manriquez MD        benzonatate (Tessalon) cap 200 mg  200 mg Oral TID PRN Dandy Manriquez MD        cefTRIAXone (Rocephin) 2 g in sodium chloride 0.9% 100 mL IVPB-ADDV  2 g Intravenous Q24H Dandy Manriquez MD        metroNIDAZOLE in sodium chloride 0.79% (Flagyl) 5 mg/mL IVPB premix 500 mg  500 mg Intravenous q12h Dandy Manriquez  mL/hr at 02/25/25 0511 500 mg at 02/25/25 0511     No current Epic-ordered outpatient medications on file.   [3]   Allergies  Allergen Reactions    Cat Hair Extract Coughing, ITCHING, RASH and SHORTNESS OF BREATH    Dander UNKNOWN     dogs    Mushrooms RASH     Around mouth

## 2025-02-26 VITALS
HEART RATE: 58 BPM | BODY MASS INDEX: 33 KG/M2 | DIASTOLIC BLOOD PRESSURE: 73 MMHG | SYSTOLIC BLOOD PRESSURE: 120 MMHG | TEMPERATURE: 99 F | OXYGEN SATURATION: 97 % | WEIGHT: 188.81 LBS | RESPIRATION RATE: 18 BRPM

## 2025-02-26 LAB
ALBUMIN SERPL-MCNC: 3.9 G/DL (ref 3.2–4.8)
ALBUMIN/GLOB SERPL: 1.5 {RATIO} (ref 1–2)
ALP LIVER SERPL-CCNC: 54 U/L
ALT SERPL-CCNC: 51 U/L
ANION GAP SERPL CALC-SCNC: 8 MMOL/L (ref 0–18)
AST SERPL-CCNC: 47 U/L (ref ?–34)
BASOPHILS # BLD AUTO: 0.02 X10(3) UL (ref 0–0.2)
BASOPHILS NFR BLD AUTO: 0.2 %
BILIRUB DIRECT SERPL-MCNC: 0.1 MG/DL (ref ?–0.3)
BILIRUB SERPL-MCNC: 0.4 MG/DL (ref 0.3–1.2)
BUN BLD-MCNC: <5 MG/DL (ref 9–23)
CALCIUM BLD-MCNC: 8.6 MG/DL (ref 8.7–10.4)
CHLORIDE SERPL-SCNC: 105 MMOL/L (ref 98–112)
CO2 SERPL-SCNC: 26 MMOL/L (ref 21–32)
CREAT BLD-MCNC: 0.66 MG/DL
DEPRECATED RDW RBC AUTO: 37.4 FL (ref 35.1–46.3)
EGFRCR SERPLBLD CKD-EPI 2021: 120 ML/MIN/1.73M2 (ref 60–?)
EOSINOPHIL # BLD AUTO: 0 X10(3) UL (ref 0–0.7)
EOSINOPHIL NFR BLD AUTO: 0 %
ERYTHROCYTE [DISTWIDTH] IN BLOOD BY AUTOMATED COUNT: 12.9 % (ref 11–15)
GLOBULIN PLAS-MCNC: 2.6 G/DL (ref 2–3.5)
GLUCOSE BLD-MCNC: 119 MG/DL (ref 70–99)
HCT VFR BLD AUTO: 35 %
HGB BLD-MCNC: 12 G/DL
IMM GRANULOCYTES # BLD AUTO: 0.03 X10(3) UL (ref 0–1)
IMM GRANULOCYTES NFR BLD: 0.3 %
LIPASE SERPL-CCNC: 29 U/L (ref 12–53)
LYMPHOCYTES # BLD AUTO: 1.31 X10(3) UL (ref 1–4)
LYMPHOCYTES NFR BLD AUTO: 13.6 %
MCH RBC QN AUTO: 27.6 PG (ref 26–34)
MCHC RBC AUTO-ENTMCNC: 34.3 G/DL (ref 31–37)
MCV RBC AUTO: 80.5 FL
MONOCYTES # BLD AUTO: 0.47 X10(3) UL (ref 0.1–1)
MONOCYTES NFR BLD AUTO: 4.9 %
NEUTROPHILS # BLD AUTO: 7.81 X10 (3) UL (ref 1.5–7.7)
NEUTROPHILS # BLD AUTO: 7.81 X10(3) UL (ref 1.5–7.7)
NEUTROPHILS NFR BLD AUTO: 81 %
PLATELET # BLD AUTO: 244 10(3)UL (ref 150–450)
POTASSIUM SERPL-SCNC: 3.8 MMOL/L (ref 3.5–5.1)
PROT SERPL-MCNC: 6.5 G/DL (ref 5.7–8.2)
RBC # BLD AUTO: 4.35 X10(6)UL
SODIUM SERPL-SCNC: 139 MMOL/L (ref 136–145)
WBC # BLD AUTO: 9.6 X10(3) UL (ref 4–11)

## 2025-02-26 PROCEDURE — 99239 HOSP IP/OBS DSCHRG MGMT >30: CPT | Performed by: HOSPITALIST

## 2025-02-26 RX ORDER — IBUPROFEN 600 MG/1
600 TABLET, FILM COATED ORAL EVERY 8 HOURS PRN
Qty: 21 TABLET | Refills: 0 | Status: SHIPPED | OUTPATIENT
Start: 2025-02-26 | End: 2025-02-26

## 2025-02-26 RX ORDER — ACETAMINOPHEN 325 MG/1
650 TABLET ORAL EVERY 6 HOURS PRN
Status: DISCONTINUED | OUTPATIENT
Start: 2025-02-26 | End: 2025-02-26

## 2025-02-26 RX ORDER — IBUPROFEN 400 MG/1
400 TABLET, FILM COATED ORAL EVERY 6 HOURS PRN
Status: DISCONTINUED | OUTPATIENT
Start: 2025-02-26 | End: 2025-02-26

## 2025-02-26 RX ORDER — IBUPROFEN 600 MG/1
600 TABLET, FILM COATED ORAL EVERY 8 HOURS PRN
Qty: 21 TABLET | Refills: 0 | Status: SHIPPED | OUTPATIENT
Start: 2025-02-26

## 2025-02-26 NOTE — PROGRESS NOTES
[address] [address]      Date: 2/26/2025       Patient Name: Racheal Clemente      To Whom it may concern:    This letter has been written at the patient's request.  Racheal Clemente  was admitted in hospital from 2/24/2025 to 2/26/2025 for treatment of a medical condition.     The patient may return to work on Friday 2/28/2025.     Racheal Clemente  will follow up with his regular primary care physician No primary care provider on file. after discharge . Please communicate with patient's  regular primary care physician No primary care provider on file. for any release to work or  work restrictions or paperwork after released from hospital.      Thank you,    Heather Conteh MD.   [facility]  2/26/2025  [phone]

## 2025-02-26 NOTE — PROGRESS NOTES
lab  [facility]  part of [facility]    Progress Note    Racheal Clemente Patient Status:  Observation    1995 MRN [id number]   Location [facility] 4W/SW/SE Attending Heather Conteh MD   Hosp Day # 0 PCP No primary care provider on file.     Subjective:  Feels ok  some incisional pain    Objective/Physical Exam:  General: Alert, orientated x3.  Cooperative.  No apparent distress.  Vital Signs:  Blood pressure 113/72, pulse 64, temperature 98.1 °F (36.7 °C), temperature source Oral, resp. rate 18, weight 188 lb 12.8 oz (85.6 kg), last menstrual period 2025, SpO2 97%, not currently breastfeeding.    Abdomen:  Soft, non-distended, non-tender, with no rebound or guarding.  No peritoneal signs. No ascites.  Liver is within normal limits.  Spleen is not palpable.        Labs:  Lab Results   Component Value Date    WBC 9.6 2025    HGB 12.0 2025    HCT 35.0 2025    .0 2025    CREATSERUM 0.66 2025    BUN <5 (L) 2025     2025    K 3.8 2025     2025    CO2 26.0 2025     (H) 2025    CA 8.6 (L) 2025    ALB 3.9 2025    ALKPHO 54 2025    BILT 0.4 2025    TP 6.5 2025    AST 47 (H) 2025    ALT 51 (H) 2025    TSH 1.680 2024    LIP 29 2025    B12 478 2024       Imaging:  US GALLBLADDER (CPT=76705)    Result Date: 2025  PROCEDURE: US GALLBLADDER (CPT=76705)  COMPARISON: None available.  INDICATIONS: Persistent right upper quadrant abdominal pain.  TECHNIQUE:   The gallbladder was evaluated with grayscale ultrasound.   FINDINGS:  GALLBLADDER:   Normal size with evidence of echogenic, shadowing intraluminal calculi and wall thickening measuring 0.4 cm. There is no significant pericholecystic fluid. According to the technologist performing the study, the sonographic Lechuga sign was  elicited. BILE DUCTS:   Negative for dilatation. The  common bile duct measures 0.2 cm.  OTHER:   Visualized portions of the hepatic parenchyma are notable for diffusely increased parenchymal echogenicity and heterogeneous, coarse echotexture, compatible with hepatic steatosis.  Sagittal survey images of the 11.3 cm length right kidney reveal no collecting system dilatation.          CONCLUSION:  1. Cholelithiasis with gallbladder wall thickening and reportedly positive sonographic Lechuga sign. These findings are nonspecific, but could reflect acute cholecystitis in the appropriate clinical scenario; if clinically warranted, follow-up radionuclide hepatobiliary scanning could be pursued.  2. Negative for hepatobiliary dilatation.  3. Sonographic features of hepatic steatosis.   Dictated by (CST): Rogelio Eaton MD on 2/24/2025 at 7:48 PM     Finalized by (CST): Rogelio Eaton MD on 2/24/2025 at 7:50 PM            Assessment/Plan:  Patient Active Problem List   Diagnosis    Class 2 severe obesity with serious comorbidity and body mass index (BMI) of 36.0 to 36.9 in adult (HCC)    Encounter for therapeutic drug monitoring    Hypertriglyceridemia    Insulin resistance    Heartburn    Biliary colic   Labs ok   Doing well  Increase po   Ok for discharge today  Instructions given  No anti-biotics needed  Call for appt for one week    HEIDY GLASS MD  2/26/2025  9:30 AM

## 2025-02-26 NOTE — PLAN OF CARE
Pt is A&Ox 4, room air, tolerating low fiber soft diet, voiding freely, passing gas, tolerating pain with prn po medications, ambulating independent. Call light within reach, calls appropriately, I-bed awareness/alarm on while admitted. Pt cleared for discharge by MD. Education provided to pt w/  at bedside- see AVS - pt verbalized understanding, all questions answered to the best of my ability.      Problem: Patient Centered Care  Goal: Patient preferences are identified and integrated in the patient's plan of care  Description: Interventions:  - What would you like us to know as we care for you?   - Provide timely, complete, and accurate information to patient/family  - Incorporate patient and family knowledge, values, beliefs, and cultural backgrounds into the planning and delivery of care  - Encourage patient/family to participate in care and decision-making at the level they choose  - Honor patient and family perspectives and choices  Outcome: Adequate for Discharge     Problem: Patient/Family Goals  Goal: Patient/Family Long Term Goal  Description: Patient's Long Term Goal: to go home    Interventions:  - Pain management   - Tolerate diet advancement   - Ambulate   - See additional Care Plan goals for specific interventions  Outcome: Adequate for Discharge  Goal: Patient/Family Short Term Goal  Description: Patient's Short Term Goal: for my pain to be a 5 or less    Interventions:   - Pain medications as needed   - Nonpharmacologic interventions   - See additional Care Plan goals for specific interventions  Outcome: Adequate for Discharge     Problem: PAIN - ADULT  Goal: Verbalizes/displays adequate comfort level or patient's stated pain goal  Description: INTERVENTIONS:  - Encourage pt to monitor pain and request assistance  - Assess pain using appropriate pain scale  - Administer analgesics based on type and severity of pain and evaluate response  - Implement non-pharmacological measures as  appropriate and evaluate response  - Consider cultural and social influences on pain and pain management  - Manage/alleviate anxiety  - Utilize distraction and/or relaxation techniques  - Monitor for opioid side effects  - Notify MD/LIP if interventions unsuccessful or patient reports new pain  - Anticipate increased pain with activity and pre-medicate as appropriate  Outcome: Adequate for Discharge     Problem: DISCHARGE PLANNING  Goal: Discharge to home or other facility with appropriate resources  Description: INTERVENTIONS:  - Identify barriers to discharge w/pt and caregiver  - Include patient/family/discharge partner in discharge planning  - Arrange for needed discharge resources and transportation as appropriate  - Identify discharge learning needs (meds, wound care, etc)  - Arrange for interpreters to assist at discharge as needed  - Consider post-discharge preferences of patient/family/discharge partner  - Complete POLST form as appropriate  - Assess patient's ability to be responsible for managing their own health  - Refer to Case Management Department for coordinating discharge planning if the patient needs post-hospital services based on physician/LIP order or complex needs related to functional status, cognitive ability or social support system  Outcome: Adequate for Discharge     Problem: GASTROINTESTINAL - ADULT  Goal: Minimal or absence of nausea and vomiting  Description: INTERVENTIONS:  - Maintain adequate hydration with IV or PO as ordered and tolerated  - Nasogastric tube to low intermittent suction as ordered  - Evaluate effectiveness of ordered antiemetic medications  - Provide nonpharmacologic comfort measures as appropriate  - Advance diet as tolerated, if ordered  - Obtain nutritional consult as needed  - Evaluate fluid balance  Outcome: Adequate for Discharge  Goal: Maintains or returns to baseline bowel function  Description: INTERVENTIONS:  - Assess bowel function  - Maintain adequate  hydration with IV or PO as ordered and tolerated  - Evaluate effectiveness of GI medications  - Encourage mobilization and activity  - Obtain nutritional consult as needed  - Establish a toileting routine/schedule  - Consider collaborating with pharmacy to review patient's medication profile  Outcome: Adequate for Discharge     Problem: SKIN/TISSUE INTEGRITY - ADULT  Goal: Incision(s), wounds(s) or drain site(s) healing without S/S of infection  Description: INTERVENTIONS:  - Assess and document risk factors for pressure ulcer development  - Assess and document skin integrity  - Assess and document dressing/incision, wound bed, drain sites and surrounding tissue  - Implement wound care per orders  - Initiate isolation precautions as appropriate  - Initiate Pressure Ulcer prevention bundle as indicated  Outcome: Adequate for Discharge

## 2025-02-26 NOTE — DISCHARGE SUMMARY
[facility]  part of [facility]    Discharge Summary    Racheal Clemente Patient Status:  Observation    1995 MRN [id number]   Location [facility] 4W/SW/SE Attending Heather Conteh MD   Hosp Day # 0 PCP No primary care provider on file.     Date of Admission: 2025   Date of Discharge: 2025    Hospital Discharge Diagnoses: Acute Biliary Colic     Lace+ Score: 25  59-90 High Risk  29-58 Medium Risk  0-28   Low Risk.    TCM Follow-Up Recommendation:  LACE 29-58: Moderate Risk of readmission after discharge from the hospital.        Admitting Diagnosis: Biliary colic [K80.50]    Disposition: Home    Discharge Diagnosis: .Principal Problem:    Biliary colic      Hospital Course:   Reason for Admission:   Per Dr. Manriquez  Racheal Clemente is a 29 year old female with a history of obesity on Zepbound, who presents with abdominal pain for 3 weeks. Patient was recently started on Zepbound for weight loss in 224. She has lost 20 pounds in 6 weeks, but developed abdominal discomfort, abdominal pain and diarrhea. She went to ED for it on 25. Workup unremarkable, and was discharged home. She since stopped the Zepbound. She then went to see Dr. Simmons from GI on 25. CT A/P was ordered, but was denied by her insurance. In the past 3 weeks, the intermittent epigastric pain gradually worsened. She denied fever, chills, vomiting, dysuria, cough or chest pain. In ED, patient has normal vitals, no fever or tachycardia. Blood work normal, including liver enzymes, no leukocytosis. US showed cholelithiasis with gallbladder wall thickening and reportedly positive sonographic Lechuga sign. Ceftriaxone and metronidazole given. Surgery consulted.       Discharge Physical Exam:   Physical Exam:    General: No acute distress.   Respiratory: Clear to auscultation bilaterally. No wheezes. No rhonchi.  Cardiovascular: S1, S2. Regular rate and rhythm. No murmurs, rubs or gallops.    Abdomen: Soft, nontender, nondistended.  Positive bowel sounds. No rebound or guarding.  Neurologic: No focal neurological deficits.   Musculoskeletal: Moves all extremities.    Hospital Course:    Acute cholecystitis   Cholelithiasis   S/p laparoscopic cholecystectomy 2/25  LFT's normal.   Diet per surgery   Pain control with tylenol and ibuprofen   No antibiotics at discharge.   Obesity BMI 34   Hold zepbound              Complications: none    Consultants         Provider   Role Specialty     Santo Osborn MD      Consulting Physician SURGERY, GENERAL          Surgical Procedures       Case IDs Date Procedure Surgeon Location Status    [id number] 2/25/25 LAPAROSCOPIC CHOLECYSTECTOMY Santo Osborn MD [facility] MAIN OR Comp              Discharge Plan:   Discharge Condition: Stable    Current Discharge Medication List        New Orders    Details   ibuprofen 600 MG Oral Tab Take 1 tablet (600 mg total) by mouth every 8 (eight) hours as needed for Pain.           Home Meds - Unchanged    Details   acidophilus-pectin Oral Cap Take 1 capsule by mouth 2 (two) times daily.      FIBER ADULT GUMMIES OR Take 3 Pieces by mouth daily.                 Discharge Diet: As tolerated    Discharge Activity: As tolerated       Discharge Medications        START taking these medications        Instructions Prescription details   ibuprofen 600 MG Tabs  Commonly known as: Motrin      Take 1 tablet (600 mg total) by mouth every 8 (eight) hours as needed for Pain.   Quantity: 21 tablet  Refills: 0            CONTINUE taking these medications        Instructions Prescription details   acidophilus-pectin Caps  Commonly known as: Probiotic      Take 1 capsule by mouth 2 (two) times daily.   Refills: 0     FIBER ADULT GUMMIES OR      Take 3 Pieces by mouth daily.   Refills: 0               Where to Get Your Medications        These medications were sent to [facility]  Pharmacy - [place], [place] - [address] [place] [address]  [phone], [phone]  [address] [address]      Phone: [phone]   ibuprofen 600 MG Tabs         Follow up:      Follow-up Information       Santo Osborn MD. Schedule an appointment as soon as possible for a visit in 1 week(s).    Specialty: SURGERY, GENERAL  Contact information:  [address] [place] [address] [phone]                             Follow up Labs and imaging:         Time spent:  > 30 minutes    CHANELL DRAPER MD  2/26/2025

## 2025-02-26 NOTE — PLAN OF CARE
No acute changes overnight. Lap sites are C/D/I. Pain being managed with PRN oxy. IVF running and IV abx given. Voiding freely. Per patient is belching but not passing gas. Tolerating clear liquids without nausea/vomiting. Plan to discharge home when medically stable.     Problem: Patient Centered Care  Goal: Patient preferences are identified and integrated in the patient's plan of care  Description: Interventions:  - What would you like us to know as we care for you?   - Provide timely, complete, and accurate information to patient/family  - Incorporate patient and family knowledge, values, beliefs, and cultural backgrounds into the planning and delivery of care  - Encourage patient/family to participate in care and decision-making at the level they choose  - Honor patient and family perspectives and choices  Outcome: Progressing     Problem: Patient/Family Goals  Goal: Patient/Family Long Term Goal  Description: Patient's Long Term Goal: to go home    Interventions:  - Pain management   - Tolerate diet advancement   - Ambulate   - See additional Care Plan goals for specific interventions  Outcome: Progressing  Goal: Patient/Family Short Term Goal  Description: Patient's Short Term Goal: for my pain to be a 5 or less    Interventions:   - Pain medications as needed   - Nonpharmacologic interventions   - See additional Care Plan goals for specific interventions  Outcome: Progressing     Problem: PAIN - ADULT  Goal: Verbalizes/displays adequate comfort level or patient's stated pain goal  Description: INTERVENTIONS:  - Encourage pt to monitor pain and request assistance  - Assess pain using appropriate pain scale  - Administer analgesics based on type and severity of pain and evaluate response  - Implement non-pharmacological measures as appropriate and evaluate response  - Consider cultural and social influences on pain and pain management  - Manage/alleviate anxiety  - Utilize distraction and/or relaxation  techniques  - Monitor for opioid side effects  - Notify MD/LIP if interventions unsuccessful or patient reports new pain  - Anticipate increased pain with activity and pre-medicate as appropriate  Outcome: Progressing     Problem: DISCHARGE PLANNING  Goal: Discharge to home or other facility with appropriate resources  Description: INTERVENTIONS:  - Identify barriers to discharge w/pt and caregiver  - Include patient/family/discharge partner in discharge planning  - Arrange for needed discharge resources and transportation as appropriate  - Identify discharge learning needs (meds, wound care, etc)  - Arrange for interpreters to assist at discharge as needed  - Consider post-discharge preferences of patient/family/discharge partner  - Complete POLST form as appropriate  - Assess patient's ability to be responsible for managing their own health  - Refer to Case Management Department for coordinating discharge planning if the patient needs post-hospital services based on physician/LIP order or complex needs related to functional status, cognitive ability or social support system  Outcome: Progressing     Problem: GASTROINTESTINAL - ADULT  Goal: Minimal or absence of nausea and vomiting  Description: INTERVENTIONS:  - Maintain adequate hydration with IV or PO as ordered and tolerated  - Nasogastric tube to low intermittent suction as ordered  - Evaluate effectiveness of ordered antiemetic medications  - Provide nonpharmacologic comfort measures as appropriate  - Advance diet as tolerated, if ordered  - Obtain nutritional consult as needed  - Evaluate fluid balance  Outcome: Progressing  Goal: Maintains or returns to baseline bowel function  Description: INTERVENTIONS:  - Assess bowel function  - Maintain adequate hydration with IV or PO as ordered and tolerated  - Evaluate effectiveness of GI medications  - Encourage mobilization and activity  - Obtain nutritional consult as needed  - Establish a toileting  routine/schedule  - Consider collaborating with pharmacy to review patient's medication profile  Outcome: Progressing     Problem: SKIN/TISSUE INTEGRITY - ADULT  Goal: Incision(s), wounds(s) or drain site(s) healing without S/S of infection  Description: INTERVENTIONS:  - Assess and document risk factors for pressure ulcer development  - Assess and document skin integrity  - Assess and document dressing/incision, wound bed, drain sites and surrounding tissue  - Implement wound care per orders  - Initiate isolation precautions as appropriate  - Initiate Pressure Ulcer prevention bundle as indicated  Outcome: Progressing

## 2025-02-28 NOTE — PAYOR COMM NOTE
--------------  DISCHARGE REVIEW    Payor: SARAH PANTOJA [facility] PLAN  Subscriber #:  [id number]  Authorization Number: [id number]    Admit date: N/A  Admit time:  N/A  Discharge Date: 2025  5:53 PM     Admitting Physician: Shalonda Khan MD  Attending Physician:  No att. providers found  Primary Care Physician: No primary care provider on file.     Observation   [facility]  part of [facility]    Discharge Summary    Racheal Clemente Patient Status:  Observation    1995 MRN [id number]   Location [facility] 4W/SW/SE Attending Heather Conteh MD   Hosp Day # 0 PCP No primary care provider on file.     Date of Admission: 2025   Date of Discharge: 2025    Hospital Discharge Diagnoses: Acute Biliary Colic     Lace+ Score: 25  59-90 High Risk  29-58 Medium Risk  0-28   Low Risk.    TCM Follow-Up Recommendation:  LACE 29-58: Moderate Risk of readmission after discharge from the hospital.        Admitting Diagnosis: Biliary colic [K80.50]    Disposition: Home    Discharge Diagnosis: .Principal Problem:    Biliary colic      Hospital Course:   Reason for Admission:   Per Dr. Manriquez  Racheal Clemente is a 29 year old female with a history of obesity on Zepbound, who presents with abdominal pain for 3 weeks. Patient was recently started on Zepbound for weight loss in 224. She has lost 20 pounds in 6 weeks, but developed abdominal discomfort, abdominal pain and diarrhea. She went to ED for it on 25. Workup unremarkable, and was discharged home. She since stopped the Zepbound. She then went to see Dr. Simmons from GI on 25. CT A/P was ordered, but was denied by her insurance. In the past 3 weeks, the intermittent epigastric pain gradually worsened. She denied fever, chills, vomiting, dysuria, cough or chest pain. In ED, patient has normal vitals, no fever or tachycardia. Blood work normal, including liver enzymes, no leukocytosis. US showed  cholelithiasis with gallbladder wall thickening and reportedly positive sonographic Lechuga sign. Ceftriaxone and metronidazole given. Surgery consulted.       Discharge Physical Exam:   Physical Exam:    General: No acute distress.   Respiratory: Clear to auscultation bilaterally. No wheezes. No rhonchi.  Cardiovascular: S1, S2. Regular rate and rhythm. No murmurs, rubs or gallops.   Abdomen: Soft, nontender, nondistended.  Positive bowel sounds. No rebound or guarding.  Neurologic: No focal neurological deficits.   Musculoskeletal: Moves all extremities.    Hospital Course:    Acute cholecystitis   Cholelithiasis   S/p laparoscopic cholecystectomy 2/25  LFT's normal.   Diet per surgery   Pain control with tylenol and ibuprofen   No antibiotics at discharge.   Obesity BMI 34   Hold zepbound              Complications: none    Consultants         Provider   Role Specialty     Santo Osborn MD      Consulting Physician SURGERY, GENERAL          Surgical Procedures       Case IDs Date Procedure Surgeon Location Status    [id number] 2/25/25 LAPAROSCOPIC CHOLECYSTECTOMY Santo Osborn MD [facility] MAIN OR Comp              Discharge Plan:   Discharge Condition: Stable    Current Discharge Medication List        New Orders    Details   ibuprofen 600 MG Oral Tab Take 1 tablet (600 mg total) by mouth every 8 (eight) hours as needed for Pain.           Home Meds - Unchanged    Details   acidophilus-pectin Oral Cap Take 1 capsule by mouth 2 (two) times daily.      FIBER ADULT GUMMIES OR Take 3 Pieces by mouth daily.                 Discharge Diet: As tolerated    Discharge Activity: As tolerated       Discharge Medications        START taking these medications        Instructions Prescription details   ibuprofen 600 MG Tabs  Commonly known as: Motrin      Take 1 tablet (600 mg total) by mouth every 8 (eight) hours as needed for Pain.   Quantity: 21 tablet  Refills: 0            CONTINUE taking these medications         Instructions Prescription details   acidophilus-pectin Caps  Commonly known as: Probiotic      Take 1 capsule by mouth 2 (two) times daily.   Refills: 0     FIBER ADULT GUMMIES OR      Take 3 Pieces by mouth daily.   Refills: 0               Where to Get Your Medications        These medications were sent to [facility]  Pharmacy - [place], [place] - [address] [phone], [phone]  [address] [address]      Phone: [phone]   ibuprofen 600 MG Tabs         Follow up:      Follow-up Information       Santo Osborn MD. Schedule an appointment as soon as possible for a visit in 1 week(s).    Specialty: SURGERY, GENERAL  Contact information:  [address] [place], [place] [address]  [facility] [address] [phone]                             Follow up Labs and imaging:         Time spent:  > 30 minutes    CHANELL DRAPER MD  2/26/2025      Imaging:  US GALLBLADDER (CPT=76705)     Result Date: 2/24/2025  PROCEDURE:         US GALLBLADDER (CPT=76705)  COMPARISON:     None available.  INDICATIONS: Persistent right upper quadrant abdominal pain.  TECHNIQUE:          The gallbladder was evaluated with grayscale ultrasound.   FINDINGS:    GALLBLADDER:     Normal size with evidence of echogenic, shadowing intraluminal calculi and wall thickening measuring 0.4 cm. There is no significant pericholecystic fluid. According to the technologist performing the study, the sonographic Lechuga sign was  elicited. BILE DUCTS:    Negative for dilatation. The common bile duct measures 0.2 cm.  OTHER:      Visualized portions of the hepatic parenchyma are notable for diffusely increased parenchymal echogenicity and heterogeneous, coarse echotexture, compatible with hepatic steatosis.       Sagittal survey images of the 11.3 cm length right kidney reveal no collecting system dilatation.           CONCLUSION:         1. Cholelithiasis with gallbladder wall thickening and reportedly positive  sonographic Lechuga sign. These findings are nonspecific, but could reflect acute cholecystitis in the appropriate clinical scenario; if clinically warranted, follow-up radionuclide hepatobiliary scanning could be pursued.  2. Negative for hepatobiliary dilatation.  3. Sonographic features of hepatic steatosis.   Dictated by (CST): Rogelio Eaton MD on 2/24/2025 at 7:48 PM     Finalized by (CST): Rogelio Eaton MD on 2/24/2025 at 7:50 PM              Assessment/Plan:      Patient Active Problem List   Diagnosis    Class 2 severe obesity with serious comorbidity and body mass index (BMI) of 36.0 to 36.9 in adult (HCC)    Encounter for therapeutic drug monitoring    Hypertriglyceridemia    Insulin resistance    Heartburn    Biliary colic   Labs ok   Doing well  Increase po   Ok for discharge today  Instructions given  No anti-biotics needed  Call for appt for one week     HEIDY GLASS MD  2/26/2025  9:30 AM

## 2025-03-12 NOTE — OPERATIVE REPORT
[facility]    PATIENT'S NAME: CONRAD MCKEON   ATTENDING PHYSICIAN: Heather Conteh MD   OPERATING PHYSICIAN: Santo Osborn MD   PATIENT ACCOUNT#:   [id number]    LOCATION:  [address] [place] [address]  MEDICAL RECORD #:   [id number]       YOB: 1995  ADMISSION DATE:       02/24/2025      OPERATION DATE:  02/25/2025    OPERATIVE REPORT      PREOPERATIVE DIAGNOSIS:  Cholecystitis, cholelithiasis.  POSTOPERATIVE DIAGNOSIS:  Cholecystitis, cholelithiasis.  PROCEDURE:  Laparoscopic cholecystectomy.     BLOOD LOSS:  3 mL.    DRAINS:  None.    SPECIMENS:  Gallbladder.    COMPLICATIONS:  None.    DISPOSITION:  Stable.    FINDINGS:  Acute cholecystitis, cholelithiasis.    OPERATIVE TECHNIQUE:  Patient came in the operating room, underwent general endotracheal anesthesia.  Compression boots were placed.  Prepped and draped the abdomen.  Began by making a supraumbilical incision.  Under direct vision, inserted a #11 bladeless trocar.  The upper midline 11 placed under direct vision as well as the 2 lateral 5 mm bladeless trocars in the right upper quadrant.  Quickly went to the gallbladder.  It was somewhat dilated.  It was aspirated.  It was somewhat firm consistent with acute cholecystitis.  We went directly to the base of the gallbladder.  Located the cystic duct and cystic artery in 2 views.  There was no evidence preoperatively of choledocholithiasis; therefore, cholangiogram was not done.  Clipped the cystic duct, divided the cystic duct.  Endoloop placed around its end.  Cystic artery triply clipped and divided.  Gallbladder removed with electrocoagulation, placed in an Endobag and removed.  Checked for residual bleeding.  There was none.  Fascia closed with Vicryl, skin with subcuticular Monocryl and Dermabond, infiltrated with 0.5% Marcaine without.  Incidentally, we did use some spray of Surgicel in the liver bed even though there was no active bleeding.     Dictated By Santo Osborn,  MD  d: 03/11/2025 15:56:27  t: 03/12/2025 00:25:47  [facility] [id number]  [facility]/

## 2025-03-30 ENCOUNTER — PATIENT MESSAGE (OUTPATIENT)
Dept: FAMILY MEDICINE CLINIC | Facility: CLINIC | Age: 30
End: 2025-03-30

## 2025-03-30 ENCOUNTER — PATIENT MESSAGE (OUTPATIENT)
Dept: INTERNAL MEDICINE CLINIC | Facility: CLINIC | Age: 30
End: 2025-03-30

## 2025-03-31 RX ORDER — ALBUTEROL SULFATE 90 UG/1
2 INHALANT RESPIRATORY (INHALATION)
Qty: 1 EACH | Refills: 0 | Status: SHIPPED | OUTPATIENT
Start: 2025-03-31

## 2025-03-31 NOTE — TELEPHONE ENCOUNTER
Please review.  Protocol failed / Has no protocol.     Requested Prescriptions   Pending Prescriptions Disp Refills    albuterol 108 (90 Base) MCG/ACT Inhalation Aero Soln 1 each 3     Sig: Inhale 2 puffs into the lungs every 4 (four) hours as needed for Wheezing.       Asthma & COPD Medication Protocol Failed - 3/31/2025  5:40 PM        Failed - ACT Score greater than or equal to 20        Failed - ACT recorded in the last 12 months        Failed - Medication is active on med list        Passed - Appointment in past 6 or next 3 months

## 2025-03-31 NOTE — TELEPHONE ENCOUNTER
See notes  Wants to resume zepbound  Last visit 12/17/24  Future Appointments   Date Time Provider Department Center   5/29/2025  4:20 PM Juliana Wilson APRN [facility]   11/5/2025  4:40 PM Juliana Wilson APRN [facility]     Was last on 5 mg zepbound months ago - had to stop d/t surgery.

## 2025-04-01 NOTE — TELEPHONE ENCOUNTER
Call pt-LHK that I sent in a refill for the albuterol as requested, please let me know what medical reason/diagnosis this was initially prescribed for.  She will need to be seen for follow-up prior to additional refills.

## 2025-04-03 RX ORDER — TIRZEPATIDE 2.5 MG/.5ML
2.5 INJECTION, SOLUTION SUBCUTANEOUS WEEKLY
Qty: 2 ML | Refills: 0 | Status: SHIPPED | OUTPATIENT
Start: 2025-04-03 | End: 2025-04-25

## 2025-05-29 ENCOUNTER — OFFICE VISIT (OUTPATIENT)
Dept: INTERNAL MEDICINE CLINIC | Facility: CLINIC | Age: 30
End: 2025-05-29
Payer: COMMERCIAL

## 2025-05-29 VITALS
HEART RATE: 68 BPM | SYSTOLIC BLOOD PRESSURE: 128 MMHG | DIASTOLIC BLOOD PRESSURE: 68 MMHG | RESPIRATION RATE: 16 BRPM | WEIGHT: 185 LBS | HEIGHT: 64 IN | BODY MASS INDEX: 31.58 KG/M2

## 2025-05-29 DIAGNOSIS — E78.5 DYSLIPIDEMIA: ICD-10-CM

## 2025-05-29 DIAGNOSIS — Z51.81 ENCOUNTER FOR THERAPEUTIC DRUG MONITORING: Primary | ICD-10-CM

## 2025-05-29 DIAGNOSIS — E66.812 CLASS 2 SEVERE OBESITY WITH SERIOUS COMORBIDITY AND BODY MASS INDEX (BMI) OF 36.0 TO 36.9 IN ADULT, UNSPECIFIED OBESITY TYPE (HCC): ICD-10-CM

## 2025-05-29 DIAGNOSIS — E88.819 INSULIN RESISTANCE: ICD-10-CM

## 2025-05-29 DIAGNOSIS — E66.01 CLASS 2 SEVERE OBESITY WITH SERIOUS COMORBIDITY AND BODY MASS INDEX (BMI) OF 36.0 TO 36.9 IN ADULT, UNSPECIFIED OBESITY TYPE (HCC): ICD-10-CM

## 2025-05-29 DIAGNOSIS — K76.0 FATTY LIVER: ICD-10-CM

## 2025-05-29 PROCEDURE — 99213 OFFICE O/P EST LOW 20 MIN: CPT | Performed by: NURSE PRACTITIONER

## 2025-05-29 RX ORDER — TIRZEPATIDE 5 MG/.5ML
5 INJECTION, SOLUTION SUBCUTANEOUS WEEKLY
Qty: 2 ML | Refills: 3 | Status: SHIPPED | OUTPATIENT
Start: 2025-05-29

## 2025-05-29 RX ORDER — TIRZEPATIDE 5 MG/.5ML
INJECTION, SOLUTION SUBCUTANEOUS
Refills: 0 | Status: CANCELLED | OUTPATIENT
Start: 2025-05-29

## 2025-05-29 RX ORDER — TIRZEPATIDE 5 MG/.5ML
INJECTION, SOLUTION SUBCUTANEOUS
COMMUNITY
Start: 2025-05-23 | End: 2025-05-29

## 2025-05-29 NOTE — PATIENT INSTRUCTIONS
Continue making lifestyle changes that focus on good nutrition, regular exercise and stress management.    Medication Plan: Continue Zepbound at 5 mg weekly with option to increase dose if weight plateau >3 weeks develops. Can send Genero message with home scale weight if needed.    Tips while taking an injectable medication:    Be an intuitive eater. Listen to your hunger and fullness signals, stopping when you are full.  Consume protein and produce in your day, striving for a rainbow of color of produce.  Reduce portions to staring size of 1 cup size and check in with your gut to see if you are full. Set the timer to slow down your eating pace to allow for 15-20 minutes to complete a meal. Recommend following the \"2 bite rule\".  Reduce refined sugars and high fat foods, as they may contribute to greater side effects of nausea and heartburn.  Stop eating 3 hours before bedtime to allow your food to digest.  Remain hydrated with water or non caloric and non caffeine beverages.  Use over the counter ricardo lozenge/supplement to help reduce nausea if needed.  If you have been off your medication for more than 2 weeks please notify our office to determine next dosing, as return to previous dose may not be appropriate or tolerated.  Zepbound can be kept at room temperature for up to 3 weeks.      Next steps to work on before next visit include: Continue to build a healthy routine with focus on balanced nutrition and fitness. Fitness goal of 150 minutes/week. Consider local [employer] and home fitness via [employer]ie.    Baseline body composition (BC) completed today with findings of total body fat 37.1% (goal < 32%), Visceral Fat 8 (goal <10), BMI 31.7 (goal <27), muscle mass 16.3% (goal >18%)      I also recommend modifying nutrition with a focus on Food 4 Fuel and eating clean, lean and green!     Eat whole foods (think farm to table sources) and minimize/eliminate processed and ultra processed foods.  Eat lean sources of  protein, recommending incorporating plant based options (no fat/cholesterol in plants) and focus on lean animal protein sources such as eggs, chicken and fish. Minimize beef such as pork and red meat to 1 serving/week.  Increase the consumption of plant based foods with a goal of making 85% of your daily nutrition from plants. Plant based foods are less calorically dense and more nutritionally dense. They do not have fat, which can contribute to insulin resistance and elevated cholesterol. Fruits and vegetables provide an array of vitamins, minerals, phytonutrients (plant protectors) and antiinflammatory properties. All these components help to prevent disease and help your body to work properly!      Re-thinking Nutrition: Learning to See Food as Fuel  October 8, 2019  Posted in Blog, Nutrition  By [employer] [facility]    “Dieting” can start to feel challenging when we begin to associate healthy behaviors with “punishment.” Too often, we overlook the real reason we eat food. It's not only meant to be enjoyed, but also to provide basic fuel for our bodies.  Learning to see food as fuel can help you find balance in your journey with weight. It will teach you about the primal purpose of food, the effect certain foods have on health, and how to listen carefully to what your body is trying to tell you.  It Starts with Cells  Did you know your body has more than 35 trillion cells? Each one serves a purpose.  Once a cell has completed its purpose, it dies. Your body replaces dead and worn-out cells with new and energetic ones. It also depends on this ongoing cell cycle to keep you healthy. And guess what? The foods you eat help shape this process.  Seeing Food as Fuel  Eating the right foods helps build and repair cells to be stronger than before. It does this by getting nutrition from whole grains, vitamins, minerals, fats, protein and other nutrients.  These essential nutrients matter greatly to every single  cell in your body. They make up your:  Cell membrane  Nucleus  Mitochondria  When cells join together, they make tissue that brings life to your bones, brain, skin, nerves, muscles, etc. The health and lifespan of your cells depend on the nutrients you get from food. That is why healthy food choices are so important.   Once you can start to see food as being fuel for your body, you will get better at making the healthy choice the easy choice. Food will be less about rewards or punishments and more about supporting your overall health and happiness.  Building Blocks of Good Nutrition  A diet without enough fiber, protein and healthy fats can cause your cells to become brittle, leaky and tired. When cells can't do what they are designed to do, problems like inflammation, cancer and other difficult health conditions start to arise.  Foods that Support Healthy Cells:  Unsaturated Fats - Fish, nuts avocados, olive oil, flax seed, etc.  Protein - Poultry, lean beef, yogurt, eggs, seeds, beans, etc.  Antioxidants - Fruits, dark green veggies, sweet potatoes, tea, etc.  So, the next time you grab something to eat, ask yourself how that food helps or hurts your body. This is a part of living mindful and being knowledgeable about what you consume regularly.  When you start to see food as fuel, not just a tasty treat or the solution to a bad temper, you will start to make healthier choices more often. Making new habits is one of the building blocks to successful long-term weight management!  Clean Eating: What is it Really About?    March 18, 2022  Posted in Blog, Nutrition  [employer] [facility]    Many people focus on “clean eating.” In a world filled with potato chips, fast food burgers and cake, switching to clean eating can be a big change. Is it time for you to make it?    What is Clean Eating?  Eating clean has many variations and has gained popularity over the last several years. It focuses on choosing whole  foods and minimally-processed foods and is more of a philosophy than a diet. The goal is to choose foods as close to their natural state as possible. Adding fruits, vegetables, meats, and whole grains is a great start. Processed foods such as chips, cookies and fats are eliminated.    Unlike other plans, clean eating isn’t specifically about portion sizes or numbers. Some find this way of eating to be easier. There is limited research on eating clean; however, a clean diet is plant-based and low in saturated fat.    Tips for Eating Clean    Limit packaged processed foods. Some of this is obvious. It’s time to trade in the chips, cookies and snack cakes for other foods such as fruits, vegetables and nuts. Additionally, you might need to change how you prepare food. Swap boxed mashed potatoes for whole baked potatoes. Instead of bagged salad, chop your own salad from fresh vegetables.    Read the labels. With the focus on minimally-processed foods, the fewer the ingredients the better. Avoid added sugar, sweeteners and preservatives.  Find other ways to spice up your food. Fresh herbs can go further than any added preservative. Basil, cilantro or chives can spice up any meal.  Choose whole grains. White bread and refined grains should be limited. Instead, choose whole-grain bread, quinoa, brown rice and oats.    Hydrate with water. Water is your main source of fluid. For some variety and extra flavor, add a slice of lime or cucumber into your water. Sodas and sugary drinks should be eliminated. Herbal teas are a great option.  Dive into dairy. Milk, unsweetened yogurt and cheese can be great choices. Try to avoid sweetened milks or yogurts.  Pack the protein. Protein from beans, nuts and legumes are great. So are lean meats without fatty flavorings. Some clean eaters avoid meat from certain grocery stores or brands due to growth hormones and antibiotics. For those, choosing organic may be an option.    Take Things  Slow  It can be a little overwhelming to begin a clean eating plan. Throwing away the contents of your cabinet may not be an option. Instead of taking away, focus on adding. Add more fruit and more vegetables to your day. You will find that by doing this, there is less room for processed foods. Small changes over time can add up. Get started today!      What are proteins?  Proteins are one of three primary macronutrients that provide energy to the human body, along with fats and carbohydrates. Proteins are also responsible for a large portion of the work that is done in cells; they are necessary for proper structure and function of tissues and organs, and also act to regulate them. They are comprised of a number of amino acids that are essential to proper body function, and serve as the building blocks of body tissue.  There are 20 different amino acids in total, and the sequence of amino acids determines a protein's structure and function. While some amino acids can be synthesized in the body, there are 9 amino acids that humans can only obtain from dietary sources (insufficient amounts of which may sometimes result in death), termed essential amino acids. Foods that provide all of the essential amino acids are called complete protein sources, and include both animal (meat, dairy, eggs, fish) as well as plant-based sources (soy, quinoa, buckwheat).    Proteins can be categorized based on the function they provide to the body. Below is a list of some types of proteins:  Antibody--proteins that protect the body from foreign particles, such as viruses and bacteria, by binding to them  Enzyme--proteins that help form new molecules as well as perform the many chemical reactions that occur throughout the body  Messenger--proteins that transmit signals throughout the body to maintain body processes  Structural component--proteins that act as building blocks for cells that ultimately allow the body to  move  Transport/storage--proteins that move molecules throughout the body  As can be seen, proteins have many important roles throughout the body, and as such, it is important to provide sufficient nutrition to the body to maintain healthy protein levels.    How much protein do I need?  The amount of protein that the human body requires daily is dependent on many conditions, including overall energy intake, growth of the individual, and physical activity level. It is often estimated based on body weight, as a percentage of total caloric intake (10-35%), or based on age alone. 0.8g/kg of body weight is a commonly cited recommended dietary allowance (RDA). This value is the minimum recommended value to maintain basic nutritional requirements, but consuming more protein, up to a certain point, maybe beneficial, depending on the sources of the protein.  The recommended range of protein intake is between 0.8 g/kg and 1.8 g/kg of body weight, dependent on the many factors listed above. People who are highly active, or who wish to build more muscle should generally consume more protein. Some sources suggest consuming between 1.8 to 2 g/kg for those who are highly active. The amount of protein a person should consume, to date, is not an exact science, and each individual should consult a specialist, be it a dietitian, doctor, or , to help determine their individual needs. I recommend your daily protein intake to be 100 grams/day.    Foods high in protein  There are many different combinations of food that a person can eat to meet their protein intake requirements. For many people, a large portion of protein intake comes from meat and dairy, though it is possible to get enough protein while meeting certain dietary restrictions you might have. Generally, it is easier to meet your RDA of protein by consuming meat and dairy, but an excess of either can have a negative health impact. There are plenty of  plant-based protein options, but they generally contain less protein in a given serving. Ideally, a person should consume a mixture of meat, dairy, and plant-based foods in order to meet their RDA and have a balanced diet replete with nutrients.    If possible, consuming a variety of complete proteins is recommended. A complete protein is a protein that contains a good amount of each of the nine essential amino acids required in the human diet. Examples of complete protein foods or meals include:    Meat/Dairy examples  Eggs  Chicken breast  Cottage cheese  Greek yogurt  Milk  Lean beef  Tuna  Turkey breast  Fish  Shrimp    Vegan/plant-based examples  Buckwheat  Hummus and arza  Soy products (tofu, tempeh, edamame beans)  Peanut butter on toast or some other bread  Beans and rice  Quinoa  Hemp and kota seeds  Spirulina  Generally, meat, poultry, fish, eggs, and dairy products are complete protein sources. Nuts and seeds, legumes, grains, and vegetables, among other things, are usually incomplete proteins. There is nothing wrong with incomplete proteins however, and there are many healthy, high protein foods that are incomplete proteins. As long as you consume a sufficient variety of incomplete proteins to get all the required amino acids, it is not necessary to specifically eat complete protein foods. In fact, certain high fat red meats for example, a common source of complete proteins, can be unhealthy.   Below are some examples of high protein foods that are not complete proteins:  Almonds  Oats   Broccoli  Lentils  Evan bread  Kota seeds  Pumpkin seeds  Peanuts  Verdi sprouts  Grapefruit  Green peas  Avocados  Mushrooms  As can be seen, there are many different foods a person can consume to meet their RDA of protein. The examples provided above do not constitute an exhaustive list of high protein or complete protein foods. As with everything else, balance is important, and the examples provided above are an  attempt at providing a list of healthier protein options (when consumed in moderation).    Amount of protein in common food      Protein Amount  Milk (1 cup/8 oz)  8 g  Egg (1 large/50 g)  6 g  Meat (1 slice / 2 oz)  14 g  Seafood (2 oz)  16 g  Bread (1 slice/64 g)   8 g  Corn (1 cup/166 g)  16 g  Rice (1 cup/195 g)  5 g  Dry Bean (1 cup/92 g)   16 g  Nuts (1 cup/92 g)    20 g  Fruits and Veggie (1 cup)  1 g    Data above taken from www.calculator.net    The Power of Protein:    High Protein Foods:  FISH  (3-6 ounces/meal)  All types of fish  Seafood (shrimp, scallops, clams, mussels, lobster)  EGGS     2-3 eggs/meal  DAIRY (2/3 to 1 ½ cup)  Cottage cheese   Greek yogurt  PLANTS (½-3/4 cup/meal)  Legumes: Dried beans and peas (black beans, quezada beans, garbanzo beans, kidney, cannellini, navy, split peas, black eyed peas)  Lentils  Quinoa  Soy (edamame, tofu)  PORK   (3-6 oz/meal)  Tenderloin  Pork chop  Top loin roast, boneless  Sirloin roast, boneless  [place] smith (nitrate free)  Boiled deli ham (nitrate free)    Additional Protein Sources:  BEEF    (3-6 oz/meal)  Flank steak       Skirt steak  Bottom round(rump roast), select    Ground beef, 90% lean (ground sirloin)  Santiago eye steak, choice  Eye of round roast, choice   POULTRY  (3-6 oz/meal)  Ground chicken or turkey  Chicken, no skin  Turkey, no skin  PROTEIN SHAKES: OWYN, Koia, and Rebbl (plant based and dairy free), Corepower by Clare De Santiago (plant based option available), Premier Protein, JuicePlus Complete (www.juiceplus.com)  PROTEIN BARS: RXBAR, PowerCrunch, Quest, Barebells, Mosh  SNACK/ON-the-GO OPTIONS: Chomps Meatstick, Oats Overnight (www.oatsovernMeta Data Analytics 360.[employer]), [place] brand protein granola, The Only Mathis (www.theonlybean.com) - roasted edamame beans in snack packs.    Nutrition and MyPlate: Vegetables  Vegetables are a major source of fiber. They’re also packed with vitamins needed for health and growth. At mealtimes, make half your plate fruits  and vegetables.  Nutrient-rich choices  Fresh, frozen, or canned--all vegetables are high in nutrients. The color of the skin tells you what’s inside. So if you eat plenty of colors, you get a variety of nutrients. Some good choices include:  Dark green vegetables, such as spinach, dora greens, kale, and broccoli.  Bright red and orange vegetables, such as carrots, sweet potatoes, red bell peppers, and tomatoes.  Starchy vegetables, such as potatoes and squash.  What makes vegetables less healthy?  Boiling vegetables causes some vitamins to escape into the water. To hold on to vitamins, briefly steam, sauté, stir-dhillon, or microwave instead. Overcooking destroys vitamins, so try to keep vegetables a little crispy.  Using a lot of margarine, butter, or salad dressing adds fat and calories, but not many nutrients. A small amount of these toppings is OK. But the more you add, the more fat you add, too.  Frozen vegetables that come with cheese sauce or other processed flavoring are high in fat and salt. It's healthier to season plain frozen vegetables yourself. Try fresh herbs, garlic, toasted almonds, or sesame seeds.  Canned vegetables often have lots of salt. Shop for low-sodium varieties.  One small change  Sneak vegetables into every meal. Shred carrots into hamburger, or add zucchini to spaghetti and meatballs. You won't even notice! Have a better idea? Write it here:  ________________________________________________________  Date Last Reviewed: 10/1/2017  © 2118-6668 [employer]. [address] [place] [address], [place] [address]. All rights reserved. This information is not intended as a substitute for professional medical care. Always follow your healthcare professional's instructions.      Build Muscle & Lose Fat  The great fat vs muscle diagram below paints a clear picture of why it’s so important for you to build muscle in order to lose fat.  Maybe you’ve wondered about muscle vs fat and why you need  to build muscle to lose fat, look slim and keep the inches off.  Well, look no further! With the fat vs muscle diagram below you’ll see why healthy permanent weight loss requires you to build muscle to lose fat.  Fat vs Muscle Diagram  The facts are clear. The best way to lose fat and look slimmer is to build muscle. Since one picture’s worth a thousand words, here’s 5 lbs of muscle vs fat of the same weight. Notice 5 lbs of fat is three times bigger.    This means that if you were to build 5 lbs of muscle and lose 5 lbs of fat, you would weigh exactly the same, but look smaller and firmer.  So imagine if it were 25 lbs or 50 lbs of lost fat vs muscle gained.  This is why it’s possible for you to lose fat inches when exercising, yet show no change in scale weight. And can you see how firm the muscle looks compared to the lumpy, tapioca pudding consistency of fat?  Build Muscle to Lose Fat Benefits  Although daily physical activity, like walking, swimming and aerobics are essential to good heart health and weight management, combining muscle building weight training with cardiovascular exercise, gives you an unbeatable combination to lose fat and keep it off permanently.  Of course it takes a few weeks before you see any measurable changes. But you’ll start to build muscle, lose fat and burn more calories from the moment you begin weight training. Building muscle helps you:  1. Burn more calories. Unlike fat, muscle beefs up your metabolism to help you burn about 70% more calories than fat can.  2. Improve appearance. When done properly, strength training can greatly improve your posture and help to prevent joint pain.  3. Build confidence. Strong muscles and joints increase your level of confidence in your abilities to perform many lifestyle activities.  4. Prevent injury. Strength training can build stronger muscles and more limber, flexible joints, which play a crucial role in preventing injury.  5. Increase bone  density. Weight bearing activities improve your bone density and reduce bone loss. This helps to prevent osteoporosis.  Studies show that weight training combined with aerobics and stretching is the best way to build a strong, firm body and keep it that way.  So, if you want to look and feel better than ever, you need to build muscle to lose fat.     Taken from [email or web address] by Dontae Ackerman

## 2025-05-29 NOTE — PROGRESS NOTES
Racheal Clemente is a 29 year old female presents today for follow-up on medical weight loss program for the treatment of overweight, obesity, or morbid obesity with associated fatty liver, dyslipidemia, IR.    S:  Current weight   Wt Readings from Last 6 Encounters:   05/29/25 185 lb (83.9 kg)   02/24/25 188 lb 12.8 oz (85.6 kg)   02/18/25 191 lb 6.4 oz (86.8 kg)   02/13/25 181 lb (82.1 kg)   02/12/25 183 lb (83 kg)   02/11/25 193 lb (87.5 kg)    AND BMI Body mass index is 31.76 kg/m²..    Patient has lost 27# since LOV in 12/2024 as NP consult via [facility]  She has been compliant with therapy. She has been tolerating return to Zepbound well without reported SEs. She has recovered well from gallbladder surgery. Significantly reduced fatty food intake d/t diarrhea post cholecystectomy. No lifestyle log completed. Reviewed discharge note in EMR from hospital stay.    Testing/consult completed since LOV: Dietician: no.    Social hx and PMH reviewed. Employed as orthopedic MA with . In relationship with boyfriend with 2 children.    REVIEW OF SYSTEMS:  GENERAL: feels well otherwise  EYES: denies vision changes or high pain/pressure.  LUNGS: denies shortness of breath with exertion  CARDIOVASCULAR: denies chest pain on exertion, denies palpitations or pedal edema  GI: denies abdominal pain.  No N/V/C, see above  MUSCULOSKELETAL: no acute joint or muscle pain  NEURO: denies headaches  PSYCH: denies change in behavior or mood, denies feeling sad or depressed    EXAM:  /68   Pulse 68   Resp 16   Ht 5' 4\" (1.626 m)   Wt 185 lb (83.9 kg)   LMP 05/15/2025 (Approximate)   BMI 31.76 kg/m²    GENERAL: well developed, well nourished, in no apparent distress, obese  EYES: conjunctiva pink, sclera non icteric, PERRLA  HEENT: Mallampati score: 2  NECK: supple, no adenopathy or thyromegaly  LUNGS: CTA in all fields, breathing non labored  CARDIO: RRR without murmur, normal S1 and S2 without clicks or gallops, no pedal  edema.  GI: +BS, soft, non tender  NEURO/MS: motor and sensory grossly intact  PSYCH: pleasant, cooperative, normal mood and affect    ASSESSMENT AND PLAN:  Reviewed Initial Weight Data and Goal Weight Loss:       Encounter Diagnoses   Name Primary?    Encounter for therapeutic drug monitoring Yes    Class 2 severe obesity with serious comorbidity and body mass index (BMI) of 36.0 to 36.9 in adult, unspecified obesity type (HCC)     Fatty liver     Dyslipidemia     Insulin resistance        No orders of the defined types were placed in this encounter.      Meds & Refills for this Visit:  Requested Prescriptions     Signed Prescriptions Disp Refills    Tirzepatide-Weight Management (ZEPBOUND) 5 MG/0.5ML Subcutaneous Solution Auto-injector 2 mL 3     Sig: Inject 5 mg into the skin once a week.       Imaging & Consults:  None      Plan:  Patient has lost 27# since LOV 12/2024 on Zepbound 5 mg weekly with a total weight loss of 27# since initial consult on 12/17/24 with initial weight of 212# and historical baseline BMI 37.21.  Labs reviewed. Weight loss goal: lose 20# in 6 months and be at pre pregnancy weight in 1 year. CPM, discussed option to increase dose if weight plateau develops. Baseline BC obtained and reviewed.  on nutrition and fitness. See patient instructions below for additional plans and patient counseling.      Patient Instructions   Continue making lifestyle changes that focus on good nutrition, regular exercise and stress management.    Medication Plan: Continue Zepbound at 5 mg weekly with option to increase dose if weight plateau >3 weeks develops. Can send [employer] message with home scale weight if needed.    Tips while taking an injectable medication:    Be an intuitive eater. Listen to your hunger and fullness signals, stopping when you are full.  Consume protein and produce in your day, striving for a rainbow of color of produce.  Reduce portions to staring size of 1 cup size and check in  with your gut to see if you are full. Set the timer to slow down your eating pace to allow for 15-20 minutes to complete a meal. Recommend following the \"2 bite rule\".  Reduce refined sugars and high fat foods, as they may contribute to greater side effects of nausea and heartburn.  Stop eating 3 hours before bedtime to allow your food to digest.  Remain hydrated with water or non caloric and non caffeine beverages.  Use over the counter ricardo lozenge/supplement to help reduce nausea if needed.  If you have been off your medication for more than 2 weeks please notify our office to determine next dosing, as return to previous dose may not be appropriate or tolerated.  Zepbound can be kept at room temperature for up to 3 weeks.      Next steps to work on before next visit include: Continue to build a healthy routine with focus on balanced nutrition and fitness. Fitness goal of 150 minutes/week. Consider local [employer] and home fitness via [employer]UCHE[employer] catie.    Baseline body composition (BC) completed today with findings of total body fat 37.1% (goal < 32%), Visceral Fat 8 (goal <10), BMI 31.7 (goal <27), muscle mass 16.3% (goal >18%)      I also recommend modifying nutrition with a focus on Food 4 Fuel and eating clean, lean and green!     Eat whole foods (think farm to table sources) and minimize/eliminate processed and ultra processed foods.  Eat lean sources of protein, recommending incorporating plant based options (no fat/cholesterol in plants) and focus on lean animal protein sources such as eggs, chicken and fish. Minimize beef such as pork and red meat to 1 serving/week.  Increase the consumption of plant based foods with a goal of making 85% of your daily nutrition from plants. Plant based foods are less calorically dense and more nutritionally dense. They do not have fat, which can contribute to insulin resistance and elevated cholesterol. Fruits and vegetables provide an array of vitamins, minerals, phytonutrients  (plant protectors) and antiinflammatory properties. All these components help to prevent disease and help your body to work properly!      Re-thinking Nutrition: Learning to See Food as Fuel  October 8, 2019  Posted in Blog, Nutrition  By Your [employer] Campaign    “Dieting” can start to feel challenging when we begin to associate healthy behaviors with “punishment.” Too often, we overlook the real reason we eat food. It's not only meant to be enjoyed, but also to provide basic fuel for our bodies.  Learning to see food as fuel can help you find balance in your journey with weight. It will teach you about the primal purpose of food, the effect certain foods have on health, and how to listen carefully to what your body is trying to tell you.  It Starts with Cells  Did you know your body has more than 35 trillion cells? Each one serves a purpose.  Once a cell has completed its purpose, it dies. Your body replaces dead and worn-out cells with new and energetic ones. It also depends on this ongoing cell cycle to keep you healthy. And guess what? The foods you eat help shape this process.  Seeing Food as Fuel  Eating the right foods helps build and repair cells to be stronger than before. It does this by getting nutrition from whole grains, vitamins, minerals, fats, protein and other nutrients.  These essential nutrients matter greatly to every single cell in your body. They make up your:  Cell membrane  Nucleus  Mitochondria  When cells join together, they make tissue that brings life to your bones, brain, skin, nerves, muscles, etc. The health and lifespan of your cells depend on the nutrients you get from food. That is why healthy food choices are so important.   Once you can start to see food as being fuel for your body, you will get better at making the healthy choice the easy choice. Food will be less about rewards or punishments and more about supporting your overall health and happiness.  Building Blocks of  Good Nutrition  A diet without enough fiber, protein and healthy fats can cause your cells to become brittle, leaky and tired. When cells can't do what they are designed to do, problems like inflammation, cancer and other difficult health conditions start to arise.  Foods that Support Healthy Cells:  Unsaturated Fats - Fish, nuts avocados, olive oil, flax seed, etc.  Protein - Poultry, lean beef, yogurt, eggs, seeds, beans, etc.  Antioxidants - Fruits, dark green veggies, sweet potatoes, tea, etc.  So, the next time you grab something to eat, ask yourself how that food helps or hurts your body. This is a part of living mindful and being knowledgeable about what you consume regularly.  When you start to see food as fuel, not just a tasty treat or the solution to a bad temper, you will start to make healthier choices more often. Making new habits is one of the building blocks to successful long-term weight management!  Clean Eating: What is it Really About?    March 18, 2022  Posted in Blog, Nutrition  By Your Weight Mat[employer] Campaign    Many people focus on “clean eating.” In a world filled with potato chips, fast food burgers and cake, switching to clean eating can be a big change. Is it time for you to make it?    What is Clean Eating?  Eating clean has many variations and has gained popularity over the last several years. It focuses on choosing whole foods and minimally-processed foods and is more of a philosophy than a diet. The goal is to choose foods as close to their natural state as possible. Adding fruits, vegetables, meats, and whole grains is a great start. Processed foods such as chips, cookies and fats are eliminated.    Unlike other plans, clean eating isn’t specifically about portion sizes or numbers. Some find this way of eating to be easier. There is limited research on eating clean; however, a clean diet is plant-based and low in saturated fat.    Tips for Eating Clean    Limit packaged processed  foods. Some of this is obvious. It’s time to trade in the chips, cookies and snack cakes for other foods such as fruits, vegetables and nuts. Additionally, you might need to change how you prepare food. Swap boxed mashed potatoes for whole baked potatoes. Instead of bagged salad, chop your own salad from fresh vegetables.    Read the labels. With the focus on minimally-processed foods, the fewer the ingredients the better. Avoid added sugar, sweeteners and preservatives.  Find other ways to spice up your food. Fresh herbs can go further than any added preservative. Basil, cilantro or chives can spice up any meal.  Choose whole grains. White bread and refined grains should be limited. Instead, choose whole-grain bread, quinoa, brown rice and oats.    Hydrate with water. Water is your main source of fluid. For some variety and extra flavor, add a slice of lime or cucumber into your water. Sodas and sugary drinks should be eliminated. Herbal teas are a great option.  Dive into dairy. Milk, unsweetened yogurt and cheese can be great choices. Try to avoid sweetened milks or yogurts.  Pack the protein. Protein from beans, nuts and legumes are great. So are lean meats without fatty flavorings. Some clean eaters avoid meat from certain grocery stores or brands due to growth hormones and antibiotics. For those, choosing organic may be an option.    Take Things Slow  It can be a little overwhelming to begin a clean eating plan. Throwing away the contents of your cabinet may not be an option. Instead of taking away, focus on adding. Add more fruit and more vegetables to your day. You will find that by doing this, there is less room for processed foods. Small changes over time can add up. Get started today!      What are proteins?  Proteins are one of three primary macronutrients that provide energy to the human body, along with fats and carbohydrates. Proteins are also responsible for a large portion of the work that is done  in cells; they are necessary for proper structure and function of tissues and organs, and also act to regulate them. They are comprised of a number of amino acids that are essential to proper body function, and serve as the building blocks of body tissue.  There are 20 different amino acids in total, and the sequence of amino acids determines a protein's structure and function. While some amino acids can be synthesized in the body, there are 9 amino acids that humans can only obtain from dietary sources (insufficient amounts of which may sometimes result in death), termed essential amino acids. Foods that provide all of the essential amino acids are called complete protein sources, and include both animal (meat, dairy, eggs, fish) as well as plant-based sources (soy, quinoa, buckwheat).    Proteins can be categorized based on the function they provide to the body. Below is a list of some types of proteins:  Antibody--proteins that protect the body from foreign particles, such as viruses and bacteria, by binding to them  Enzyme--proteins that help form new molecules as well as perform the many chemical reactions that occur throughout the body  Messenger--proteins that transmit signals throughout the body to maintain body processes  Structural component--proteins that act as building blocks for cells that ultimately allow the body to move  Transport/storage--proteins that move molecules throughout the body  As can be seen, proteins have many important roles throughout the body, and as such, it is important to provide sufficient nutrition to the body to maintain healthy protein levels.    How much protein do I need?  The amount of protein that the human body requires daily is dependent on many conditions, including overall energy intake, growth of the individual, and physical activity level. It is often estimated based on body weight, as a percentage of total caloric intake (10-35%), or based on age alone. 0.8g/kg of  body weight is a commonly cited recommended dietary allowance (RDA). This value is the minimum recommended value to maintain basic nutritional requirements, but consuming more protein, up to a certain point, maybe beneficial, depending on the sources of the protein.  The recommended range of protein intake is between 0.8 g/kg and 1.8 g/kg of body weight, dependent on the many factors listed above. People who are highly active, or who wish to build more muscle should generally consume more protein. Some sources suggest consuming between 1.8 to 2 g/kg for those who are highly active. The amount of protein a person should consume, to date, is not an exact science, and each individual should consult a specialist, be it a dietitian, doctor, or , to help determine their individual needs. I recommend your daily protein intake to be 100 grams/day.    Foods high in protein  There are many different combinations of food that a person can eat to meet their protein intake requirements. For many people, a large portion of protein intake comes from meat and dairy, though it is possible to get enough protein while meeting certain dietary restrictions you might have. Generally, it is easier to meet your RDA of protein by consuming meat and dairy, but an excess of either can have a negative health impact. There are plenty of plant-based protein options, but they generally contain less protein in a given serving. Ideally, a person should consume a mixture of meat, dairy, and plant-based foods in order to meet their RDA and have a balanced diet replete with nutrients.    If possible, consuming a variety of complete proteins is recommended. A complete protein is a protein that contains a good amount of each of the nine essential amino acids required in the human diet. Examples of complete protein foods or meals include:    Meat/Dairy examples  Eggs  Chicken breast  Cottage cheese  Greek yogurt  Milk  Lean  beef  Tuna  Turkey breast  Fish  Shrimp    Vegan/plant-based examples  Buckwheat  Hummus and raza  Soy products (tofu, tempeh, edamame beans)  Peanut butter on toast or some other bread  Beans and rice  Quinoa  Hemp and kota seeds  Spirulina  Generally, meat, poultry, fish, eggs, and dairy products are complete protein sources. Nuts and seeds, legumes, grains, and vegetables, among other things, are usually incomplete proteins. There is nothing wrong with incomplete proteins however, and there are many healthy, high protein foods that are incomplete proteins. As long as you consume a sufficient variety of incomplete proteins to get all the required amino acids, it is not necessary to specifically eat complete protein foods. In fact, certain high fat red meats for example, a common source of complete proteins, can be unhealthy.   Below are some examples of high protein foods that are not complete proteins:  Almonds  Oats   Broccoli  Lentils  Evan bread  Kota seeds  Pumpkin seeds  Peanuts  [place] sprouts  Grapefruit  Green peas  Avocados  Mushrooms  As can be seen, there are many different foods a person can consume to meet their RDA of protein. The examples provided above do not constitute an exhaustive list of high protein or complete protein foods. As with everything else, balance is important, and the examples provided above are an attempt at providing a list of healthier protein options (when consumed in moderation).    Amount of protein in common food      Protein Amount  Milk (1 cup/8 oz)  8 g  Egg (1 large/50 g)  6 g  Meat (1 slice / 2 oz)  14 g  Seafood (2 oz)  16 g  Bread (1 slice/64 g)   8 g  Corn (1 cup/166 g)  16 g  Rice (1 cup/195 g)  5 g  Dry Bean (1 cup/92 g)   16 g  Nuts (1 cup/92 g)    20 g  Fruits and Veggie (1 cup)  1 g    Data above taken from www.calculator.net    The Power of Protein:    High Protein Foods:  FISH  (3-6 ounces/meal)  All types of fish  Seafood (shrimp, scallops, clams,  mussels, lobster)  EGGS     2-3 eggs/meal  DAIRY (2/3 to 1 ½ cup)  Cottage cheese   Greek yogurt  PLANTS (½-3/4 cup/meal)  Legumes: Dried beans and peas (black beans, quezada beans, garbanzo beans, kidney, cannellini, navy, split peas, black eyed peas)  Lentils  Quinoa  Soy (edamame, tofu)  PORK   (3-6 oz/meal)  Tenderloin  Pork chop  Top loin roast, boneless  Sirloin roast, boneless  [place] smith (nitrate free)  Boiled deli ham (nitrate free)    Additional Protein Sources:  BEEF    (3-6 oz/meal)  Flank steak       Skirt steak  Bottom round(rump roast), select    Ground beef, 90% lean (ground sirloin)  Santiago eye steak, choice  Eye of round roast, choice   POULTRY  (3-6 oz/meal)  Ground chicken or turkey  Chicken, no skin  Turkey, no skin  PROTEIN SHAKES: OWYN, Raquel, and Rebbl (plant based and dairy free), [employer]power [employer] Clare (plant based option available), Premier Protein, JuicePlus Complete (www.juiceplus.com)  PROTEIN BARS: RXBAR, PowerCrunch, Quest, Barebells, Mosh  SNACK/ON-the-GO OPTIONS: Chomps Meatstick, Oats Overnight (www.oatsovernight.[employer]), [place] brand protein granola, The Only Mathis (www.theonlybean.com) - roasted edamame beans in snack packs.    Nutrition and MyPlate: Vegetables  Vegetables are a major source of fiber. They’re also packed with vitamins needed for health and growth. At mealtimes, make half your plate fruits and vegetables.  Nutrient-rich choices  Fresh, frozen, or canned--all vegetables are high in nutrients. The color of the skin tells you what’s inside. So if you eat plenty of colors, you get a variety of nutrients. Some good choices include:  Dark green vegetables, such as spinach, dora greens, kale, and broccoli.  Bright red and orange vegetables, such as carrots, sweet potatoes, red bell peppers, and tomatoes.  Starchy vegetables, such as potatoes and squash.  What makes vegetables less healthy?  Boiling vegetables causes some vitamins to escape into the water. To hold  on to vitamins, briefly steam, sauté, stir-dhillon, or microwave instead. Overcooking destroys vitamins, so try to keep vegetables a little crispy.  Using a lot of margarine, butter, or salad dressing adds fat and calories, but not many nutrients. A small amount of these toppings is OK. But the more you add, the more fat you add, too.  Frozen vegetables that come with cheese sauce or other processed flavoring are high in fat and salt. It's healthier to season plain frozen vegetables yourself. Try fresh herbs, garlic, toasted almonds, or sesame seeds.  Canned vegetables often have lots of salt. Shop for low-sodium varieties.  One small change  Sneak vegetables into every meal. Shred carrots into hamburger, or add zucchini to spaghetti and meatballs. You won't even notice! Have a better idea? Write it here:  ________________________________________________________  Date Last Reviewed: 10/1/2017  © 1327-9479 [employer]. [address] [place] [address], [place] [address]. All rights reserved. This information is not intended as a substitute for professional medical care. Always follow your healthcare professional's instructions.      Build Muscle & Lose Fat  The great fat vs muscle diagram below paints a clear picture of why it’s so important for you to build muscle in order to lose fat.  Maybe you’ve wondered about muscle vs fat and why you need to build muscle to lose fat, look slim and keep the inches off.  Well, look no further! With the fat vs muscle diagram below you’ll see why healthy permanent weight loss requires you to build muscle to lose fat.  Fat vs Muscle Diagram  The facts are clear. The best way to lose fat and look slimmer is to build muscle. Since one picture’s worth a thousand words, here’s 5 lbs of muscle vs fat of the same weight. Notice 5 lbs of fat is three times bigger.    This means that if you were to build 5 lbs of muscle and lose 5 lbs of fat, you would weigh exactly the same, but look  smaller and firmer.  So imagine if it were 25 lbs or 50 lbs of lost fat vs muscle gained.  This is why it’s possible for you to lose fat inches when exercising, yet show no change in scale weight. And can you see how firm the muscle looks compared to the lumpy, tapioca pudding consistency of fat?  Build Muscle to Lose Fat Benefits  Although daily physical activity, like walking, swimming and aerobics are essential to good heart health and weight management, combining muscle building weight training with cardiovascular exercise, gives you an unbeatable combination to lose fat and keep it off permanently.  Of course it takes a few weeks before you see any measurable changes. But you’ll start to build muscle, lose fat and burn more calories from the moment you begin weight training. Building muscle helps you:  1. Burn more calories. Unlike fat, muscle beefs up your metabolism to help you burn about 70% more calories than fat can.  2. Improve appearance. When done properly, strength training can greatly improve your posture and help to prevent joint pain.  3. Build confidence. Strong muscles and joints increase your level of confidence in your abilities to perform many lifestyle activities.  4. Prevent injury. Strength training can build stronger muscles and more limber, flexible joints, which play a crucial role in preventing injury.  5. Increase bone density. Weight bearing activities improve your bone density and reduce bone loss. This helps to prevent osteoporosis.  Studies show that weight training combined with aerobics and stretching is the best way to build a strong, firm body and keep it that way.  So, if you want to look and feel better than ever, you need to build muscle to lose fat.     Taken from www.[employer] by Dontae Ackerman        Medication use and SEs reviewed with patient.    Return in about 4 months (around 9/29/2025) for weight management via clinic or Telemedicine Visit and in clinic in  January.    Patient verbalizes understanding.    DOCUMENTATION OF TIME SPENT: Code selection for this visit was based on time spent : 30 minutes on date of service in preparing to see the patient, obtaining and/or reviewing separately obtained history, performing a medically appropriate examination, counseling and educating the patient/family/caregiver, ordering medications or testing, referring and communicating with other healthcare providers, documenting clinical information in the electronic medical record, independently interpreting results and communicating results to the patient/family/caregiver and care coordination with the patient's other providers.

## 2025-06-16 ENCOUNTER — HOSPITAL ENCOUNTER (OUTPATIENT)
Age: 30
Discharge: HOME | End: 2025-06-16
Payer: COMMERCIAL

## 2025-06-16 ENCOUNTER — APPOINTMENT (OUTPATIENT)
Dept: GENERAL RADIOLOGY | Age: 30
End: 2025-06-16
Payer: COMMERCIAL

## 2025-06-16 VITALS
TEMPERATURE: 98 F | DIASTOLIC BLOOD PRESSURE: 67 MMHG | OXYGEN SATURATION: 100 % | HEART RATE: 67 BPM | SYSTOLIC BLOOD PRESSURE: 135 MMHG | RESPIRATION RATE: 18 BRPM

## 2025-06-16 DIAGNOSIS — M41.34 THORACOGENIC SCOLIOSIS OF THORACIC REGION: Primary | ICD-10-CM

## 2025-06-16 DIAGNOSIS — M51.34 THORACIC DEGENERATIVE DISC DISEASE: ICD-10-CM

## 2025-06-16 LAB — B-HCG UR QL: NEGATIVE

## 2025-06-16 PROCEDURE — 81025 URINE PREGNANCY TEST: CPT

## 2025-06-16 PROCEDURE — 72072 X-RAY EXAM THORAC SPINE 3VWS: CPT

## 2025-06-16 PROCEDURE — 99214 OFFICE O/P EST MOD 30 MIN: CPT

## 2025-06-16 PROCEDURE — 96372 THER/PROPH/DIAG INJ SC/IM: CPT

## 2025-06-16 RX ORDER — KETOROLAC TROMETHAMINE 30 MG/ML
30 INJECTION, SOLUTION INTRAMUSCULAR; INTRAVENOUS ONCE
Status: COMPLETED | OUTPATIENT
Start: 2025-06-16 | End: 2025-06-16

## 2025-06-16 RX ORDER — NAPROXEN 500 MG/1
500 TABLET ORAL 2 TIMES DAILY PRN
Qty: 14 TABLET | Refills: 0 | Status: SHIPPED | OUTPATIENT
Start: 2025-06-16 | End: 2025-06-23

## 2025-06-16 RX ORDER — TIZANIDINE HYDROCHLORIDE 2 MG/1
2 CAPSULE, GELATIN COATED ORAL NIGHTLY
Qty: 7 CAPSULE | Refills: 0 | Status: SHIPPED | OUTPATIENT
Start: 2025-06-16 | End: 2025-06-23

## 2025-06-16 NOTE — DISCHARGE INSTRUCTIONS
You were evaluated today for thoracic back pain, which appears to be musculoskeletal in nature. This is likely due to degenerative disease along with thoracic scoliosis.     You may take NSAIDs such as ibuprofen or naproxen as directed. Apply heat or ice, and avoid heavy lifting or twisting movements until symptoms improve.    Gentle stretching and maintaining good posture may help prevent recurrence.    Return or seek emergency care if:  - Pain worsens significantly or spreads to the chest or abdomen  - You develop numbness, weakness, or bowel/bladder symptoms  - You experience fever, weight loss, or night sweats    Follow up with your primary care provider/orthopedic if pain does not improve within 1-2 weeks.

## 2025-06-16 NOTE — ED INITIAL ASSESSMENT (HPI)
Patient arrives ambulatory with c/o mid back pain that has been intermittent since last year, but worse since Friday. Reports the pain is a burning sensation. Reports she has tried flexeril and Toradol without relief.

## 2025-06-16 NOTE — ED PROVIDER NOTES
History     Chief Complaint   Patient presents with    Back Pain     Entered by patient       Subjective:   JOE    Racheal Clemente, 29 year old female with notable medical history of fatty liver disease, insuline resistance, dyslipidemia who presents with midthoracic back pain that started Friday. Has had this in the past about 1.5 years ago. States pain was relieved with flexeril and toradol at that time. Pt reports a 6th lumbar vertebrae that she has been diagnosed with previous. Reports pain is burning and exacerbated by certain movement. Denies fevers, chills, rashes, fevers, CP, SOB. Denies any recent injury. Last surgery was in February. Denies any urinary complaints.     Problem List[1]   Objective:   Past Medical History:    Fatty liver              Past Surgical History:   Procedure Laterality Date    Cholecystectomy  02/25/2024                Social History     Socioeconomic History    Marital status: Single   Tobacco Use    Smoking status: Never    Smokeless tobacco: Never   Vaping Use    Vaping status: Never Used   Substance and Sexual Activity    Alcohol use: Yes     Comment: socially    Drug use: Never     Social Drivers of Health     Food Insecurity: No Food Insecurity (2/24/2025)    NCSS - Food Insecurity     Worried About Running Out of Food in the Last Year: No     Ran Out of Food in the Last Year: No   Transportation Needs: No Transportation Needs (2/24/2025)    NCSS - Transportation     Lack of Transportation: No   Housing Stability: Not At Risk (2/24/2025)    NCSS - Housing/Utilities     Has Housing: Yes     Worried About Losing Housing: No     Unable to Get Utilities: No              Medications Ordered Prior to Encounter[2]      Constitutional and vital signs reviewed.      All other systems reviewed and negative except as noted above.    I have reviewed the family history, social history, allergies, and outpatient medications.     History reviewed from EMR: Encounters, problem list,  allergies, medications      Physical Exam     ED Triage Vitals [06/16/25 1549]   /67   Pulse 67   Resp 18   Temp 98 °F (36.7 °C)   Temp src Oral   SpO2 100 %   O2 Device None (Room air)       Current:/67   Pulse 67   Temp 98 °F (36.7 °C) (Oral)   Resp 18   LMP 06/13/2025 (Approximate)   SpO2 100%       Physical Exam  Vitals and nursing note reviewed.   Constitutional:       General: She is not in acute distress.     Appearance: Normal appearance. She is not ill-appearing or toxic-appearing.   HENT:      Head: Normocephalic and atraumatic.      Right Ear: External ear normal.      Left Ear: External ear normal.      Nose: Nose normal.   Eyes:      General:         Right eye: No discharge.         Left eye: No discharge.      Extraocular Movements: Extraocular movements intact.      Conjunctiva/sclera: Conjunctivae normal.      Pupils: Pupils are equal, round, and reactive to light.   Cardiovascular:      Rate and Rhythm: Normal rate and regular rhythm.      Pulses: Normal pulses.      Heart sounds: Normal heart sounds.   Pulmonary:      Effort: Pulmonary effort is normal.      Breath sounds: Normal breath sounds.   Musculoskeletal:      Cervical back: Normal, normal range of motion and neck supple.      Thoracic back: Tenderness present. No swelling, signs of trauma, lacerations, spasms or bony tenderness. Normal range of motion.        Back:       Comments: TTP bilateral thoracic paraspinal muscles   Skin:     General: Skin is warm and dry.      Coloration: Skin is not jaundiced or pale.   Neurological:      General: No focal deficit present.      Mental Status: She is alert and oriented to person, place, and time.   Psychiatric:         Mood and Affect: Mood normal.         Behavior: Behavior normal.            ED Course     Labs Reviewed   POCT PREGNANCY URINE - Normal     XR THORACIC SPINE (3 VIEWS) (CPT=72072)   Final Result   PROCEDURE: XR THORACIC SPINE (3 VIEWS) (CPT=72072)        COMPARISON: None available.       INDICATIONS: Mid-back pain ongoing for 4 days without precipitating    antecedent injury. Chronic pain ongoing for 1 year; over the preceding 4    days, medication is not working.       TECHNIQUE: Thoracic spine radiographs (3 views)       FINDINGS:    COMMENT: Thoracic spinal radiographs have suboptimal sensitivity for    fracture detection.   ALIGNMENT: The thoracic kyphosis is preserved. Left convex scoliosis of    the lower thoracolumbar spine is evident.   VERTEBRAL BODIES:   There is no evidence of fracture or radiographically    apparent osseous lesion. The vertebral body heights are preserved. Minimal    anterior osteophyte formation is apparent in the upper thoracic spine.   DISC SPACES: Mild intervertebral disc space narrowing is appreciated.   PARASPINAL REGION: No suspicious displacement of the paraspinal lines is    appreciated.   OTHER: Included portions of the thorax are grossly unremarkable. Surgical    clips project over the right upper quadrant, likely from prior    cholecystectomy.                       =====   CONCLUSION:    1. Scoliosis and mild multilevel degenerative changes of the thoracic    spine.       2. No radiographically visible acute osseous injury of the thoracic spine.               Dictated by (CST): Rogelio Eaton MD on 6/16/2025 at 5:01 PM        Finalized by (CST): Rogelio Eaton MD on 6/16/2025 at 5:03 PM                   Vitals:    06/16/25 1549   BP: 135/67   Pulse: 67   Resp: 18   Temp: 98 °F (36.7 °C)   TempSrc: Oral   SpO2: 100%            [facility]        Racheal PETTY Clmeente, 29 year old female with medical history as noted above who presents with thoracic back pain. Thoracic spine xray showing scoliosis and degenerative changes. Patient denies this previous finding. Pain is sharp and burning and worsening by activity. There is paraspinal tenderness.   -Differential diagnosis considered but not limited to thoracic muscle strain, facet  dysfunction, degenerative disease, scoliosis, vs other.  -No red flag symptoms identified. Vitals stable. Neurologic exam normal.  Patient reassured that findings are chronic and degenerative in nature. Symptoms likely reflect mechanical or posture-related pain.  Conservative management initiated: NSAIDs, stretching, sport  referral. Imaging findings discussed and patient advised to follow up with PCP or spine specialist if symptoms persist or worsen.  -The patient is encouraged to return if any concerning symptoms arise. Additional verbal discharge instructions are given and discussed. Discharge medications are discussed. The patient is in good condition throughout the visit today and remains so upon discharge. I discuss the plan of care with the patient, who expresses understanding. All questions and concerns are addressed to the patient's satisfaction prior to discharge today.     ** See ED course below for additional information on care provided / interventions / notable events throughout patient's encounter.           ** I have independently reviewed the radiology images, clinical lab results, and ECG tracings as described above (if applicable)    ** Concerning co-morbidities possibly affecting complaint / care: none        Medical Decision Making  Amount and/or Complexity of Data Reviewed  External Data Reviewed: radiology.  Radiology: ordered and independent interpretation performed. Decision-making details documented in ED Course.    Risk  OTC drugs.  Prescription drug management.        Disposition and Plan     Disposition:  Discharge  6/16/2025  5:10 pm    Clinical Impression:  1. Thoracogenic scoliosis of thoracic region    2. Thoracic degenerative disc disease            Home care instructions:    You were evaluated today for thoracic back pain, which appears to be musculoskeletal in nature. This is likely from your scoliosis and your degenerative disease.    You may take NSAIDs such as  ibuprofen or naproxen as directed. Apply heat or ice, and avoid heavy lifting or twisting movements until symptoms improve.    Gentle stretching and maintaining good posture may help prevent recurrence.    Return or seek emergency care if:  - Pain worsens significantly or spreads to the chest or abdomen  - You develop numbness, weakness, or bowel/bladder symptoms  - You experience fever, weight loss, or night sweats    Follow up with your primary care provider/ortho if pain does not improve within 1-2 weeks.      Follow-up:  [facility], [address] [facility], [place] [id number] [address]  [facility] [address] [phone]    New PCP contact if needed    Sports  Line  To schedule an appointment with the Orthopedics and Sports Medicine Department, please text or call [phone] and choose option 3 when prompted            Medications Prescribed:  Discharge Medication List as of 6/16/2025  5:22 PM        START taking these medications    Details   naproxen 500 MG Oral Tab Take 1 tablet (500 mg total) by mouth 2 (two) times daily as needed., Normal, Disp-14 tablet, R-0      tiZANidine HCl (ZANAFLEX) 2 MG Oral Cap Take 1 capsule (2 mg total) by mouth at bedtime for 7 days., Normal, Disp-7 capsule, R-0               Johanna, ZEYNEP, APRN, FNP-BC  Family Nurse Practitioner  [facility]      The above patient (and/or guardian) was made aware that an appropriate evaluation has been performed, and that no additional testing is required at this time. In my medical judgment, there is currently no evidence of an immediate life-threatening or surgical condition, therefore discharge is indicated at this time. The patient (and/or guardian) was advised that a small risk still exists that a serious condition could develop. The patient was instructed to arrange close follow-up with their primary care provider (or the referral provider given today). The patient received written and  verbal instructions regarding their condition / concerns, demonstrated understanding, and is agreement with the outpatient treatment plan.            [1]   Patient Active Problem List  Diagnosis    Class 2 severe obesity with serious comorbidity and body mass index (BMI) of 36.0 to 36.9 in adult (HCC)    Encounter for therapeutic drug monitoring    Hypertriglyceridemia    Insulin resistance    Heartburn    Biliary colic    Fatty liver    Dyslipidemia   [2]   No current facility-administered medications on file prior to encounter.     Current Outpatient Medications on File Prior to Encounter   Medication Sig Dispense Refill    Tirzepatide-Weight Management (ZEPBOUND) 5 MG/0.5ML Subcutaneous Solution Auto-injector Inject 5 mg into the skin once a week. 2 mL 3    [] Tirzepatide-Weight Management (ZEPBOUND) 2.5 MG/0.5ML Subcutaneous Solution Auto-injector Inject 2.5 mg into the skin once a week for 4 doses. 2 mL 0    albuterol 108 (90 Base) MCG/ACT Inhalation Aero Soln Inhale 2 puffs into the lungs every 4 to 6 hours as needed for Wheezing. 1 each 0    ibuprofen 600 MG Oral Tab Take 1 tablet (600 mg total) by mouth every 8 (eight) hours as needed for Pain. 21 tablet 0    acidophilus-pectin Oral Cap Take 1 capsule by mouth 2 (two) times daily.      FIBER ADULT GUMMIES OR Take 3 Pieces by mouth daily.

## 2025-06-25 ENCOUNTER — PATIENT MESSAGE (OUTPATIENT)
Dept: INTERNAL MEDICINE CLINIC | Facility: CLINIC | Age: 30
End: 2025-06-25

## 2025-06-25 DIAGNOSIS — E66.812 CLASS 2 SEVERE OBESITY WITH SERIOUS COMORBIDITY AND BODY MASS INDEX (BMI) OF 36.0 TO 36.9 IN ADULT, UNSPECIFIED OBESITY TYPE (HCC): ICD-10-CM

## 2025-06-25 DIAGNOSIS — E66.01 CLASS 2 SEVERE OBESITY WITH SERIOUS COMORBIDITY AND BODY MASS INDEX (BMI) OF 36.0 TO 36.9 IN ADULT, UNSPECIFIED OBESITY TYPE (HCC): ICD-10-CM

## 2025-06-25 DIAGNOSIS — Z51.81 ENCOUNTER FOR THERAPEUTIC DRUG MONITORING: ICD-10-CM

## 2025-06-25 DIAGNOSIS — E78.5 DYSLIPIDEMIA: ICD-10-CM

## 2025-06-25 DIAGNOSIS — E88.819 INSULIN RESISTANCE: ICD-10-CM

## 2025-06-25 DIAGNOSIS — K76.0 FATTY LIVER: ICD-10-CM

## 2025-06-26 RX ORDER — TIRZEPATIDE 5 MG/.5ML
5 INJECTION, SOLUTION SUBCUTANEOUS WEEKLY
Qty: 2 ML | Refills: 3 | OUTPATIENT
Start: 2025-06-26

## 2025-06-26 NOTE — TELEPHONE ENCOUNTER
Requesting zepbound 5mg  LOV: 5/29/25  RTC: 4 months  Filled: 5/29/25 #2ml with 3 refills    Future Appointments   Date Time Provider Department Center   7/7/2025  8:30 AM Justine Boyd APRN [facility]   11/5/2025  4:40 PM Juliana Wilson APRN [facility]   5/14/2026  4:20 PM Juliana Wilson APRN [facility]     Has refills. denied

## 2025-07-21 ENCOUNTER — OFFICE VISIT (OUTPATIENT)
Dept: FAMILY MEDICINE CLINIC | Facility: CLINIC | Age: 30
End: 2025-07-21
Payer: COMMERCIAL

## 2025-07-21 VITALS
DIASTOLIC BLOOD PRESSURE: 72 MMHG | WEIGHT: 175.88 LBS | RESPIRATION RATE: 18 BRPM | HEART RATE: 73 BPM | HEIGHT: 64 IN | TEMPERATURE: 98 F | SYSTOLIC BLOOD PRESSURE: 112 MMHG | BODY MASS INDEX: 30.03 KG/M2

## 2025-07-21 DIAGNOSIS — R42 DIZZINESS: ICD-10-CM

## 2025-07-21 DIAGNOSIS — R20.0 ARM NUMBNESS LEFT: ICD-10-CM

## 2025-07-21 DIAGNOSIS — R07.89 OTHER CHEST PAIN: Primary | ICD-10-CM

## 2025-07-21 DIAGNOSIS — R47.81 SLURRED SPEECH: ICD-10-CM

## 2025-07-21 DIAGNOSIS — R53.83 FATIGUE, UNSPECIFIED TYPE: ICD-10-CM

## 2025-07-21 PROCEDURE — 99215 OFFICE O/P EST HI 40 MIN: CPT | Performed by: FAMILY MEDICINE

## 2025-07-21 NOTE — PATIENT INSTRUCTIONS
Recommend Emergency Department (ED) evaluation now for episode of chest pain, dizziness, LT arm numbness, diaphoresis, slurred speech followed by fatigue. R/O cardiogenic cause vs other (on Zepbound, h/o recent cholecystectomy).    Explained to patient (ands guardian or parent if less than 18 years old) that additional emergent evaluation and treatment are indicated now and that our outpatient Family Medicine Clinic does not have the capability of performing these additional medical services.    Recommend ED evaluation now at [facility], [facility], or other nearby hospital as per patient's preference.    The patient (and/or guardian or parent if less than 18 years old) indicate(s) understanding and agreement to the plan of care discussed today.    The patient (and/or guardian or parent if less than 18 years old) has elected to go to [facility] Emergency Department now based on the above recommendation.    Patient is medically stable, including from a cardiovascular/hemodynamic, pulmonary, and neurological standpoint.self     The patient will drive herself to the ED now.    Call 911 for any new or suddenly worsening symptoms, and for any signs of acute or respiratory distress, or any emergent changes.

## 2025-07-21 NOTE — PROGRESS NOTES
HPI:   Racheal Clemente is a 29 year old female who presents for:     Patient presents with:  Dizziness: X 1 month room is spinning, intermittent  Intermittent episodes of sharp central chest Pain: X 1 month, occur randomly. Comes and goes.  Most recent episode this Saturday was associated with pain radiating down left arm, LT Upper extremity numbness, slurred speech, diaphoresis, and fatigue occurring after the episode  Patient denies associated shortness of breath or palpitations    Patient is s/p lap chol 2/2025, zepbound was was stopped then and again restated 6/25/2025      See ROS below for other pertinent positive and negative complaints.    I reviewed her's Past Medical History, Past Surgical History, Family and Social History    Labs:   Lab Results   Component Value Date/Time    WBC 9.6 02/26/2025 06:44 AM    HGB 12.0 02/26/2025 06:44 AM    .0 02/26/2025 06:44 AM      Lab Results   Component Value Date/Time     (H) 02/26/2025 06:44 AM     02/26/2025 06:44 AM    K 3.8 02/26/2025 06:44 AM     02/26/2025 06:44 AM    CO2 26.0 02/26/2025 06:44 AM    CREATSERUM 0.66 02/26/2025 06:44 AM    CA 8.6 (L) 02/26/2025 06:44 AM    ALB 3.9 02/26/2025 06:44 AM    TP 6.5 02/26/2025 06:44 AM    ALKPHO 54 02/26/2025 06:44 AM    AST 47 (H) 02/26/2025 06:44 AM    ALT 51 (H) 02/26/2025 06:44 AM    BILT 0.4 02/26/2025 06:44 AM    TSH 1.680 02/09/2024 03:27 PM        Lab Results   Component Value Date/Time    CHOLEST 163 02/09/2024 03:27 PM    HDL 52 02/09/2024 03:27 PM    TRIG 168 (H) 02/09/2024 03:27 PM    LDL 82 02/09/2024 03:27 PM    NONHDLC 111 02/09/2024 03:27 PM           Current Medications[1]   Past Medical History[2]   Past Surgical History[3]   Family History[4]  Allergies[5]   Social History:  Social Hx on file[6]      REVIEW OF SYSTEMS:   Review of Systems   Constitutional:  Positive for diaphoresis and fatigue. Negative for fever.   HENT: Negative.     Eyes: Negative.  Negative for visual  disturbance.   Respiratory: Negative.  Negative for cough, chest tightness, shortness of breath and wheezing.    Cardiovascular:  Positive for chest pain. Negative for palpitations and leg swelling.   Gastrointestinal: Negative.  Negative for abdominal pain.   Genitourinary: Negative.    Musculoskeletal: Negative.    Skin: Negative.    Neurological:  Positive for dizziness, speech difficulty, weakness and numbness. Negative for seizures, syncope, facial asymmetry and headaches.   Hematological: Negative.    Psychiatric/Behavioral: Negative.         EXAM:   /72   Pulse 73   Temp 98.2 °F (36.8 °C) (Oral)   Resp 18   Ht 5' 4\" (1.626 m)   Wt 175 lb 14.4 oz (79.8 kg)   LMP 06/13/2025 (Approximate)   BMI 30.19 kg/m²  Body mass index is 30.19 kg/m².  Physical Exam  Vitals and nursing note reviewed.   Constitutional:       General: She is awake. She is not in acute distress.     Appearance: Normal appearance. She is well-developed, well-groomed and normal weight. She is not ill-appearing or toxic-appearing.   HENT:      Head: Normocephalic and atraumatic.      Right Ear: Tympanic membrane, ear canal and external ear normal. There is no impacted cerumen.      Left Ear: Tympanic membrane, ear canal and external ear normal. There is no impacted cerumen.      Nose: Nose normal. No rhinorrhea.      Mouth/Throat:      Mouth: Mucous membranes are moist.      Pharynx: Oropharynx is clear. No oropharyngeal exudate or posterior oropharyngeal erythema.   Eyes:      General: No scleral icterus.        Right eye: No discharge.         Left eye: No discharge.      Extraocular Movements: Extraocular movements intact.      Conjunctiva/sclera: Conjunctivae normal.      Pupils: Pupils are equal, round, and reactive to light.   Neck:      Thyroid: No thyroid mass, thyromegaly or thyroid tenderness.      Vascular: Normal carotid pulses. No carotid bruit, hepatojugular reflux or JVD.      Trachea: Trachea normal.   Cardiovascular:       Rate and Rhythm: Normal rate and regular rhythm.      Pulses: Normal pulses.      Heart sounds: Normal heart sounds, S1 normal and S2 normal. No murmur heard.     No S3 or S4 sounds.   Pulmonary:      Effort: Pulmonary effort is normal. No respiratory distress.      Breath sounds: Normal breath sounds and air entry. No stridor. No wheezing, rhonchi or rales.   Chest:      Chest wall: No tenderness.   Abdominal:      General: Bowel sounds are normal. There is no distension.      Palpations: Abdomen is soft. There is no hepatomegaly, splenomegaly or mass.      Tenderness: There is no abdominal tenderness. There is no guarding or rebound.      Hernia: No hernia is present.   Musculoskeletal:         General: No swelling, tenderness or deformity.      Cervical back: Normal range of motion and neck supple. No rigidity or tenderness.      Left lower leg: No edema.   Lymphadenopathy:      Cervical: No cervical adenopathy.      Right cervical: No superficial, deep or posterior cervical adenopathy.     Left cervical: No superficial, deep or posterior cervical adenopathy.      Upper Body:      Right upper body: No supraclavicular or axillary adenopathy.      Left upper body: No supraclavicular or axillary adenopathy.      Lower Body: No right inguinal adenopathy. No left inguinal adenopathy.   Skin:     General: Skin is warm and dry.      Capillary Refill: Capillary refill takes less than 2 seconds.      Coloration: Skin is not jaundiced or pale.      Findings: No bruising, erythema or rash.      Nails: There is no clubbing.   Neurological:      General: No focal deficit present.      Mental Status: She is alert and oriented to person, place, and time.      Cranial Nerves: Cranial nerves 2-12 are intact. No cranial nerve deficit, dysarthria or facial asymmetry.      Sensory: Sensation is intact. No sensory deficit.      Motor: Motor function is intact. No weakness, tremor or abnormal muscle tone.      Coordination:  Coordination is intact. Coordination normal.      Deep Tendon Reflexes: Reflexes are normal and symmetric. Reflexes normal.   Psychiatric:         Attention and Perception: Attention and perception normal.         Mood and Affect: Mood and affect normal.         Speech: Speech normal.         Behavior: Behavior normal. Behavior is cooperative.         Thought Content: Thought content normal.         Cognition and Memory: Cognition and memory normal.         Judgment: Judgment normal.       ASSESSMENT AND PLAN:   1. Racheal Clemente is a 29 year old female who presents for:    1. Other chest pain    2. Dizziness    3. Arm numbness left    4. Slurred speech    5. Fatigue, unspecified type    6. Diaphoresis    Etiology unclear  Recommend Emergency Department (ED) evaluation now  See additional recommendations and follow up below      Patient Instructions   Recommend Emergency Department (ED) evaluation now for episode of chest pain, dizziness, LT arm numbness, diaphoresis, slurred speech followed by fatigue. R/O cardiogenic cause vs other (on Zepbound, h/o recent cholecystectomy).    Explained to patient (ands guardian or parent if less than 18 years old) that additional emergent evaluation and treatment are indicated now and that our outpatient Family [facility] Clinic does not have the capability of performing these additional medical services.    Recommend ED evaluation now at [facility], [facility], or other nearby hospital as per patient's preference.    The patient (and/or guardian or parent if less than 18 years old) indicate(s) understanding and agreement to the plan of care discussed today.    The patient (and/or guardian or parent if less than 18 years old) has elected to go to [facility] Emergency Department now based on the above recommendation.    Patient is medically stable, including from a cardiovascular/hemodynamic, pulmonary, and neurological standpoint.self     The patient will  drive herself to the ED now.    Call 911 for any new or suddenly worsening symptoms, and for any signs of acute or respiratory distress, or any emergent changes.      Follow up: as above (See AVS)    All questions were answered.  We discussed the indications, proper use, risks, and benefits of the above recommendations including any medication(s) as prescribed.  The patient (and/or guardian or parent if less than 18 years old) indicate(s) understanding and agree(s) to the above plan of care.    No orders of the defined types were placed in this encounter.    Meds & Refills for this Visit:  Requested Prescriptions      No prescriptions requested or ordered in this encounter     Other Orders, Imaging & Consults:  None    Racheal Palomino MD       [1]   Current Outpatient Medications   Medication Sig Dispense Refill    Tirzepatide-Weight Management (ZEPBOUND) 5 MG/0.5ML Subcutaneous Solution Auto-injector Inject 5 mg into the skin once a week. 2 mL 3    albuterol 108 (90 Base) MCG/ACT Inhalation Aero Soln Inhale 2 puffs into the lungs every 4 to 6 hours as needed for Wheezing. 1 each 0    FIBER ADULT GUMMIES OR Take 3 Pieces by mouth daily.      ibuprofen 600 MG Oral Tab Take 1 tablet (600 mg total) by mouth every 8 (eight) hours as needed for Pain. 21 tablet 0    acidophilus-pectin Oral Cap Take 1 capsule by mouth 2 (two) times daily. (Patient not taking: Reported on 7/21/2025)     [2]   Past Medical History:   Fatty liver   [3]   Past Surgical History:  Procedure Laterality Date    Cholecystectomy  02/25/2024   [4]   Family History  Problem Relation Age of Onset    Other (prediabetes) Mother    [5]   Allergies  Allergen Reactions    Cat Hair Extract Coughing, ITCHING, RASH and SHORTNESS OF BREATH    Dander UNKNOWN     dogs    Mushrooms RASH     Around mouth   [6]   Social History  Socioeconomic History    Marital status: Single   Tobacco Use    Smoking status: Never    Smokeless tobacco: Never   Vaping Use     Vaping status: Never Used   Substance and Sexual Activity    Alcohol use: Yes     Comment: socially    Drug use: Never

## 2025-08-05 ENCOUNTER — PATIENT MESSAGE (OUTPATIENT)
Dept: INTERNAL MEDICINE CLINIC | Facility: CLINIC | Age: 30
End: 2025-08-05

## (undated) DEVICE — 5 MM BABCOCKS WITH RATCHET HANDLES: Brand: ENDOPATH

## (undated) DEVICE — APPLICATOR ENDOSCP FOR ADJUNCTIVE HEMSTAS

## (undated) DEVICE — 3M™ TEGADERM™ HP TRANSPARENT FILM DRESSING FRAME STYLE, 9534HP, 2-3/8 X 2-3/4 IN (6 CM X 7 CM), 100/CT 4CT/CASE: Brand: 3M™ TEGADERM™

## (undated) DEVICE — SYRINGE MED 20ML STD CLR PLAS LL TIP N CTRL

## (undated) DEVICE — LAP CHOLE: Brand: MEDLINE INDUSTRIES, INC.

## (undated) DEVICE — TROCAR: Brand: KII® SLEEVE

## (undated) DEVICE — PDS II VLT 0 107CM AG ST3: Brand: ENDOLOOP

## (undated) DEVICE — TROCAR: Brand: KII FIOS FIRST ENTRY

## (undated) DEVICE — VISUALIZATION SYSTEM: Brand: CLEARIFY

## (undated) DEVICE — Device: Brand: SUTURE PASSOR PRO

## (undated) DEVICE — 20 ML SYRINGE LUER-LOCK TIP: Brand: MONOJECT

## (undated) DEVICE — SUT COAT VCRL+ 0 27IN UR-5 ABSRB VLT ANTIBACT

## (undated) DEVICE — E-Z CLEAN, PTFE COATED, ELECTROSURGICAL LAPAROSCOPIC ELECTRODE, L-HOOK, 33 CM., SINGLE-USE, FOR USE WITH HAND CONTROL PENCIL: Brand: MEGADYNE

## (undated) DEVICE — CATH IV SFTY 14GA X 2IN WNG FEP INTROCAN

## (undated) DEVICE — ADHESIVE SKIN TOP FOR WND CLSR DERMBND ADV

## (undated) DEVICE — GLOVE SUR 8.5 SENSICARE NEOPR PWD F

## (undated) DEVICE — [HIGH FLOW INSUFFLATOR,  DO NOT USE IF PACKAGE IS DAMAGED,  KEEP DRY,  KEEP AWAY FROM SUNLIGHT,  PROTECT FROM HEAT AND RADIOACTIVE SOURCES.]: Brand: PNEUMOSURE

## (undated) DEVICE — POWDER HEMSTAT 3GM OXIDIZED REGENERATED CELOS

## (undated) DEVICE — CLIP APPLIER: Brand: ENDO CLIP II

## (undated) DEVICE — PENCIL ES BTTN SWCH W/ TIP HOLSTER E-Z CLN

## (undated) DEVICE — CATHETER CHOLGM 4.5FR L18IN W/ MTL SUPP TB

## (undated) DEVICE — SYRINGE MED 10ML LL CTRL W/ FNGR GRP CLR BRL

## (undated) DEVICE — SOLUTION IV 1000ML 0.9% NACL PRESERVATIVE

## (undated) DEVICE — ENCORE® LATEX MICRO SIZE 8.5, STERILE LATEX POWDER-FREE SURGICAL GLOVE: Brand: ENCORE

## (undated) DEVICE — ANCHOR TISSUE RETRIEVAL SYSTEM, BAG SIZE 175 ML, PORT SIZE 10 MM: Brand: ANCHOR TISSUE RETRIEVAL SYSTEM

## (undated) DEVICE — SUT MCRYL 4-0 18IN PS-2 ABSRB UD 19MM 3/8 CIR

## (undated) DEVICE — SUT COAT VCRL + 0 54IN ABSRB UD ANTIBACT

## (undated) DEVICE — SOLUTION IRRIG 1000ML 0.9% NACL USP BTL

## (undated) NOTE — LETTER
[facility]  [address], [place], [place]    Authorization for Surgical Operation and Procedure                               I hereby authorize Santo Osborn MD, my physician and his/her assistants (if applicable), which may include medical students, residents, and/or fellows, to perform the following surgical operation/ procedure and administer such anesthesia as may be determined necessary by my physician: Operation/Procedure name (s) LAPAROSCOPIC CHOLECYSTECTOMY on Racheal Clemente   2.   I recognize that during the surgical operation/procedure, unforeseen conditions may necessitate additional or different procedures than those listed above.  I, therefore, further authorize and request that the above-named surgeon, assistants, or designees perform such procedures as are, in their judgment, necessary and desirable.    3.   My surgeon/physician has discussed prior to my surgery the potential benefits, risks and side effects of this procedure; the likelihood of achieving goals; and potential problems that might occur during recuperation.  They also discussed reasonable alternatives to the procedure, including risks, benefits, and side effects related to the alternatives and risks related to not receiving this procedure.  I have had all my questions answered and I acknowledge that no guarantee has been made as to the result that may be obtained.    4.   Should the need arise during my operation/procedure, which includes change of level of care prior to discharge, I also consent to the administration of blood and/or blood products.  Further, I understand that despite careful testing and screening of blood or blood products by collecting agencies, I may still be subject to ill effects as a result of receiving a blood transfusion and/or blood products.  The following are some, but not all, of the potential risks that can occur: fever and allergic reactions, hemolytic reactions,  transmission of diseases such as Hepatitis, AIDS and Cytomegalovirus (CMV) and fluid overload.  In the event that I wish to have an autologous transfusion of my own blood, or a directed donor transfusion, I will discuss this with my physician.  Check only if Refusing Blood or Blood Products  I understand refusal of blood or blood products as deemed necessary by my physician may have serious consequences to my condition to include possible death. I hereby assume responsibility for my refusal and release the hospital, its personnel, and my physicians from any responsibility for the consequences of my refusal.    o  Refuse   5.   I authorize the use of any specimen, organs, tissues, body parts or foreign objects that may be removed from my body during the operation/procedure for diagnosis, research or teaching purposes and their subsequent disposal by hospital authorities.  I also authorize the release of specimen test results and/or written reports to my treating physician on the hospital medical staff or other referring or consulting physicians involved in my care, at the discretion of the Pathologist or my treating physician.    6.   I consent to the photographing or videotaping of the operations or procedures to be performed, including appropriate portions of my body for medical, scientific, or educational purposes, provided my identity is not revealed by the pictures or by descriptive texts accompanying them.  If the procedure has been photographed/videotaped, the surgeon will obtain the original picture, image, videotape or CD.  The hospital will not be responsible for storage, release or maintenance of the picture, image, tape or CD.    7.   I consent to the presence of a  or observers in the operating room as deemed necessary by my physician or their designees.    8.   I recognize that in the event my procedure results in extended X-Ray/fluoroscopy time, I may develop a skin reaction.    9. If  I have a Do Not Attempt Resuscitation (DNAR) order in place, that status will be suspended while in the operating room, procedural suite, and during the recovery period unless otherwise explicitly stated by me (or a person authorized to consent on my behalf). The surgeon or my attending physician will determine when the applicable recovery period ends for purposes of reinstating the DNAR order.  10. Patients having a sterilization procedure: I understand that if the procedure is successful the results will be permanent and it will therefore be impossible for me to inseminate, conceive, or bear children.  I also understand that the procedure is intended to result in sterility, although the result has not been guaranteed.   11. I acknowledge that my physician has explained sedation/analgesia administration to me including the risk and benefits I consent to the administration of sedation/analgesia as may be necessary or desirable in the judgment of my physician.    I CERTIFY THAT I HAVE READ AND FULLY UNDERSTAND THE ABOVE CONSENT TO OPERATION and/or OTHER PROCEDURE.     ____________________________________  _________________________________        ______________________________  Signature of Patient    Signature of Responsible Person                Printed Name of Responsible Person                                      ____________________________________  _____________________________                ________________________________  Signature of Witness        Date  Time         Relationship to Patient    STATEMENT OF PHYSICIAN My signature below affirms that prior to the time of the procedure; I have explained to the patient and/or his/her legal representative, the risks and benefits involved in the proposed treatment and any reasonable alternative to the proposed treatment. I have also explained the risks and benefits involved in refusal of the proposed treatment and alternatives to the proposed treatment and have  answered the patient's questions. If I have a significant financial interest in a co-management agreement or a significant financial interest in any product or implant, or other significant relationship used in this procedure/surgery, I have disclosed this and had a discussion with my patient.     _____________________________________________________              _____________________________  (Signature of Physician)                                                                                         (Date)                                   (Time)  Patient Name: Racheal DOVE Chyna      : 1995      Printed: 2025     Medical Record #: [id number]                                      Page 1 of 1

## (undated) NOTE — LETTER
[facility]  Administration of Anesthesia  IRacheal agree to be cared for by a physician anesthesiologist alone and/or with a nurse anesthetist, who is specially trained to monitor me and give me medicine to put me to sleep or keep me comfortable during my procedure    I understand that my anesthesiologist and/or anesthetist is not an employee or agent of [facility] or [employer]. He or she works for [employer] [employer]    As the patient asking for anesthesia services, I agree to:  Allow the anesthesiologist (anesthesia doctor) to give me medicine and do additional procedures as necessary. Some examples are: Starting or using an “IV” to give me medicine, fluids or blood during my procedure, and having a breathing tube placed to help me breathe when I’m asleep (intubation). In the event that my heart stops working properly, I understand that my anesthesiologist will make every effort to sustain my life, unless otherwise directed by [facility] Do Not Resuscitate documents.  Tell my anesthesia doctor before my procedure:  If I am pregnant.  The last time that I ate or drank.  iii. All of the medicines I take (including prescriptions, herbal supplements, and pills I can buy without a prescription (including street drugs/illegal medications). Failure to inform my anesthesiologist about these medicines may increase my risk of anesthetic complications.  iv.If I am allergic to anything or have had a reaction to anesthesia before.  I understand how the anesthesia medicine will help me (benefits).  I understand that with any type of anesthesia medicine there are risks:  The most common risks are: nausea, vomiting, sore throat, muscle soreness, damage to my eyes, mouth, or teeth (from breathing tube placement).  Rare risks include: remembering what happened during my procedure, allergic reactions to medications, injury to my airway, heart, lungs, vision, nerves, or  muscles and in extremely rare instances death.  My doctor has explained to me other choices available to me for my care (alternatives).  Pregnant Patients (“epidural”):  I understand that the risks of having an epidural (medicine given into my back to help control pain during labor), include itching, low blood pressure, difficulty urinating, headache or slowing of the baby’s heart. Very rare risks include infection, bleeding, seizure, irregular heart rhythms and nerve injury.  Regional Anesthesia (“spinal”, “epidural”, & “nerve blocks”):  I understand that rare but potential complications include headache, bleeding, infection, seizure, irregular heart rhythms, and nerve injury.    _____________________________________________________________________________  Patient (or Representative) Signature/Relationship to Patient  Date   Time    _____________________________________________________________________________   Name (if used)    Language/Organization   Time    _____________________________________________________________________________  Nurse Anesthetist Signature     Date   Time  _____________________________________________________________________________  Anesthesiologist Signature     Date   Time  I have discussed the procedure and information above with the patient (or patient’s representative) and answered their questions. The patient or their representative has agreed to have anesthesia services.    _____________________________________________________________________________  Witness        Date   Time  I have verified that the signature is that of the patient or patient’s representative, and that it was signed before the procedure  Patient Name: Racheal Clemente     : 1995                 Printed: 2025 at 10:35 AM    Medical Record #: [id number]                                            Page 1 of 1  ----------ANESTHESIA CONSENT----------

## (undated) NOTE — LETTER
Date: 2/11/2025    Patient Name: Racheal Clemente          To Whom it may concern:     The above patient was seen at formerly [facility] for treatment of a medical condition.    This patient should be excused from attending work today.    The patient may return to work tomorrow if symptoms are improved unless told differently by another provider.        Sincerely,        IRIS Nieves